# Patient Record
Sex: FEMALE | Race: BLACK OR AFRICAN AMERICAN | NOT HISPANIC OR LATINO
[De-identification: names, ages, dates, MRNs, and addresses within clinical notes are randomized per-mention and may not be internally consistent; named-entity substitution may affect disease eponyms.]

---

## 2018-02-07 ENCOUNTER — APPOINTMENT (OUTPATIENT)
Dept: SURGERY | Facility: CLINIC | Age: 48
End: 2018-02-07

## 2018-03-14 ENCOUNTER — OUTPATIENT (OUTPATIENT)
Dept: OUTPATIENT SERVICES | Facility: HOSPITAL | Age: 48
LOS: 1 days | End: 2018-03-14
Payer: COMMERCIAL

## 2018-03-14 ENCOUNTER — APPOINTMENT (OUTPATIENT)
Dept: SURGERY | Facility: CLINIC | Age: 48
End: 2018-03-14

## 2018-03-14 VITALS
TEMPERATURE: 99 F | SYSTOLIC BLOOD PRESSURE: 109 MMHG | WEIGHT: 293 LBS | HEART RATE: 92 BPM | OXYGEN SATURATION: 96 % | DIASTOLIC BLOOD PRESSURE: 70 MMHG | HEIGHT: 63 IN

## 2018-03-14 DIAGNOSIS — Z01.818 ENCOUNTER FOR OTHER PREPROCEDURAL EXAMINATION: ICD-10-CM

## 2018-03-14 DIAGNOSIS — Z98.890 OTHER SPECIFIED POSTPROCEDURAL STATES: Chronic | ICD-10-CM

## 2018-03-14 DIAGNOSIS — E66.01 MORBID (SEVERE) OBESITY DUE TO EXCESS CALORIES: ICD-10-CM

## 2018-03-14 LAB
ALBUMIN SERPL ELPH-MCNC: 3.6 G/DL — SIGNIFICANT CHANGE UP (ref 3.3–5)
ALP SERPL-CCNC: 85 U/L — SIGNIFICANT CHANGE UP (ref 40–120)
ALT FLD-CCNC: 11 U/L — SIGNIFICANT CHANGE UP (ref 10–45)
ANION GAP SERPL CALC-SCNC: 13 MMOL/L — SIGNIFICANT CHANGE UP (ref 5–17)
APPEARANCE UR: CLEAR — SIGNIFICANT CHANGE UP
APTT BLD: 31.5 SEC — SIGNIFICANT CHANGE UP (ref 27.5–37.4)
AST SERPL-CCNC: 15 U/L — SIGNIFICANT CHANGE UP (ref 10–40)
BACTERIA # UR AUTO: PRESENT /HPF
BILIRUB SERPL-MCNC: 0.6 MG/DL — SIGNIFICANT CHANGE UP (ref 0.2–1.2)
BILIRUB UR-MCNC: NEGATIVE — SIGNIFICANT CHANGE UP
BUN SERPL-MCNC: 14 MG/DL — SIGNIFICANT CHANGE UP (ref 7–23)
CALCIUM SERPL-MCNC: 9.2 MG/DL — SIGNIFICANT CHANGE UP (ref 8.4–10.5)
CHLORIDE SERPL-SCNC: 101 MMOL/L — SIGNIFICANT CHANGE UP (ref 96–108)
CO2 SERPL-SCNC: 26 MMOL/L — SIGNIFICANT CHANGE UP (ref 22–31)
COLOR SPEC: YELLOW — SIGNIFICANT CHANGE UP
CREAT SERPL-MCNC: 0.82 MG/DL — SIGNIFICANT CHANGE UP (ref 0.5–1.3)
DIFF PNL FLD: NEGATIVE — SIGNIFICANT CHANGE UP
EPI CELLS # UR: SIGNIFICANT CHANGE UP /HPF (ref 0–5)
GLUCOSE SERPL-MCNC: 84 MG/DL — SIGNIFICANT CHANGE UP (ref 70–99)
GLUCOSE UR QL: NEGATIVE — SIGNIFICANT CHANGE UP
HCG SERPL-ACNC: <.1 MIU/ML — SIGNIFICANT CHANGE UP
HCT VFR BLD CALC: 40.3 % — SIGNIFICANT CHANGE UP (ref 34.5–45)
HGB BLD-MCNC: 12.6 G/DL — SIGNIFICANT CHANGE UP (ref 11.5–15.5)
INR BLD: 1.03 — SIGNIFICANT CHANGE UP (ref 0.88–1.16)
KETONES UR-MCNC: NEGATIVE — SIGNIFICANT CHANGE UP
LEUKOCYTE ESTERASE UR-ACNC: NEGATIVE — SIGNIFICANT CHANGE UP
MCHC RBC-ENTMCNC: 28.6 PG — SIGNIFICANT CHANGE UP (ref 27–34)
MCHC RBC-ENTMCNC: 31.3 G/DL — LOW (ref 32–36)
MCV RBC AUTO: 91.4 FL — SIGNIFICANT CHANGE UP (ref 80–100)
NITRITE UR-MCNC: NEGATIVE — SIGNIFICANT CHANGE UP
PH UR: 6 — SIGNIFICANT CHANGE UP (ref 5–8)
PLATELET # BLD AUTO: 203 K/UL — SIGNIFICANT CHANGE UP (ref 150–400)
POTASSIUM SERPL-MCNC: 3.8 MMOL/L — SIGNIFICANT CHANGE UP (ref 3.5–5.3)
POTASSIUM SERPL-SCNC: 3.8 MMOL/L — SIGNIFICANT CHANGE UP (ref 3.5–5.3)
PROT SERPL-MCNC: 7.4 G/DL — SIGNIFICANT CHANGE UP (ref 6–8.3)
PROT UR-MCNC: (no result) MG/DL
PROTHROM AB SERPL-ACNC: 11.4 SEC — SIGNIFICANT CHANGE UP (ref 9.8–12.7)
RBC # BLD: 4.41 M/UL — SIGNIFICANT CHANGE UP (ref 3.8–5.2)
RBC # FLD: 14.5 % — SIGNIFICANT CHANGE UP (ref 10.3–16.9)
RBC CASTS # UR COMP ASSIST: < 5 /HPF — SIGNIFICANT CHANGE UP
SODIUM SERPL-SCNC: 140 MMOL/L — SIGNIFICANT CHANGE UP (ref 135–145)
SP GR SPEC: 1.02 — SIGNIFICANT CHANGE UP (ref 1–1.03)
UROBILINOGEN FLD QL: 0.2 E.U./DL — SIGNIFICANT CHANGE UP
WBC # BLD: 5.5 K/UL — SIGNIFICANT CHANGE UP (ref 3.8–10.5)
WBC # FLD AUTO: 5.5 K/UL — SIGNIFICANT CHANGE UP (ref 3.8–10.5)
WBC UR QL: < 5 /HPF — SIGNIFICANT CHANGE UP

## 2018-03-14 PROCEDURE — 71046 X-RAY EXAM CHEST 2 VIEWS: CPT | Mod: 26

## 2018-03-14 PROCEDURE — 93010 ELECTROCARDIOGRAM REPORT: CPT

## 2018-03-14 RX ORDER — VITAMIN/MINERAL SUPPLEMENT WITH MAGNESIUM CARBONATE, CALCIUM CARBONATE AND FOLIC ACID 115; 80; 1 MG/1; MG/1; MG/1
0 TABLET, FILM COATED ORAL
Qty: 0 | Refills: 0 | COMMUNITY

## 2018-03-14 RX ORDER — BENZOYL PEROXIDE MICRONIZED 5.8 %
0 TOWELETTE (EA) TOPICAL
Qty: 0 | Refills: 0 | COMMUNITY

## 2018-03-14 RX ORDER — DULAGLUTIDE 4.5 MG/.5ML
0 INJECTION, SOLUTION SUBCUTANEOUS
Qty: 0 | Refills: 0 | COMMUNITY

## 2018-03-14 RX ORDER — VITAMIN E 100 UNIT
0 CAPSULE ORAL
Qty: 0 | Refills: 0 | COMMUNITY

## 2018-03-14 RX ORDER — ASCORBIC ACID 60 MG
1 TABLET,CHEWABLE ORAL
Qty: 0 | Refills: 0 | COMMUNITY

## 2018-03-14 RX ORDER — ZINC SULFATE TAB 220 MG (50 MG ZINC EQUIVALENT) 220 (50 ZN) MG
1 TAB ORAL
Qty: 0 | Refills: 0 | COMMUNITY

## 2018-03-14 NOTE — ASU PATIENT PROFILE, ADULT - PMH
Asthma    Diabetes  prediabetic  Fibroid uterus    GERD (gastroesophageal reflux disease)    Morbid obesity    Renal calculi Asthma    Diabetes  prediabetic  Fibroid uterus    GERD (gastroesophageal reflux disease)    Morbid obesity    Renal calculi    Sleep apnea

## 2018-03-14 NOTE — H&P PST ADULT - PMH
Asthma    Diabetes  prediabetic  Fibroid uterus    GERD (gastroesophageal reflux disease)    Morbid obesity    Renal calculi    Sleep apnea

## 2018-04-23 ENCOUNTER — INPATIENT (INPATIENT)
Facility: HOSPITAL | Age: 48
LOS: 0 days | Discharge: HOME | End: 2018-04-23
Attending: SURGERY | Admitting: SURGERY

## 2018-04-23 DIAGNOSIS — G47.30 SLEEP APNEA, UNSPECIFIED: ICD-10-CM

## 2018-04-23 DIAGNOSIS — E66.01 MORBID (SEVERE) OBESITY DUE TO EXCESS CALORIES: ICD-10-CM

## 2018-04-23 DIAGNOSIS — Z98.890 OTHER SPECIFIED POSTPROCEDURAL STATES: Chronic | ICD-10-CM

## 2018-04-23 DIAGNOSIS — Z91.013 ALLERGY TO SEAFOOD: ICD-10-CM

## 2018-04-23 DIAGNOSIS — R73.03 PREDIABETES: ICD-10-CM

## 2018-04-23 DIAGNOSIS — K21.9 GASTRO-ESOPHAGEAL REFLUX DISEASE WITHOUT ESOPHAGITIS: ICD-10-CM

## 2018-04-23 DIAGNOSIS — J45.909 UNSPECIFIED ASTHMA, UNCOMPLICATED: ICD-10-CM

## 2018-06-22 NOTE — PATIENT PROFILE ADULT. - SOURCE OF INFORMATION, PROFILE
"  History     Chief Complaint:  Abdominal Pain     HPI   Renetta Steward is a 34 year old female, with a history of recent miscarriage (06/14/18) who presents with abdominal pain. Per report, the patient was in for an ultrasound 7 days ago when her OBGYN diagnosed her with a miscarriage. 4 days later, because the patient had not yet passed her miscarriage, she was given Misoprostol, of which she notes that she tried taking, but was unable to tolerate it buccally 2/2 nausea and accidentally swallowed it. Following this incident, the patient has noticed that her cramps are worsening, and as of this morning she awoke with a sharp pain to her midline lower abd. She called her nurse in reference to this, who sent her here for another dose of Misoprostol \"as she didn't take the first dose right.\" In addition to her prescribed medication, the patient has taken Advil for her pain. She denies significant vaginal bleeding/passing large clots or tissue vaginally.    Allergies:  No Known Allergies     Medications:    Prilosec    Past Medical History:    Miscarriage     Past Surgical History:    History reviewed. No pertinent surgical history.    Family History:    History reviewed. No pertinent family history.     Social History:  The patient was accompanied to the ED by her partner.  Smoking Status: Never  Smokeless Tobacco: No  Alcohol Use: No    Review of Systems   Constitutional: Negative for fever.   Gastrointestinal: Positive for abdominal pain.   Genitourinary: Positive for menstrual problem and vaginal bleeding.     Physical Exam   First Vitals:  BP: 118/57  Heart Rate: 81  Temp: 98.1  F (36.7  C)  Resp: 16  Weight: 59 kg (130 lb)  SpO2: 99 %    Physical Exam  General/Appearance: appears stated age, well-groomed, appears uncomfortable  Eyes: EOMI, no scleral injection, no icterus  ENT: MMM  Neck: supple, nl ROM, no stiffness  Cardiovascular: RRR, nl S1S2, no m/r/g, 2+ pulses in all 4 extremities, cap refill " "<2sec  Respiratory: CTAB, good air movement throughout, no wheezes/rhonchi/rales, no increased WOB, no retractions  Back: no lesions  GI: abd soft, non-distended, nttp (\"feels better when you push on it\"),  no HSM, no rebound, no guarding, nl BS  MSK: SHER, good tone, no bony abnormality  Skin: warm and well-perfused, no rash, no edema, no ecchymosis, nl turgor  Neuro: GCS 15, alert and oriented, no gross focal neuro deficits  Psych: interacts appropriately  Heme: no petechia, no purpura, no active bleeding    Emergency Department Course     Imaging:  Radiology findings were communicated with the patient who voiced understanding of the findings.  OB US 1st Trimester w Transvag:  IMPRESSION:  1. Intrauterine gestational sac with a fetal pole measuring 0.6 cm in  length. No cardiac activity is seen. These findings are consistent  with fetal demise.  2. No hemorrhage or hematoma identified.    Laboratory:  Laboratory findings were communicated with the patient who voiced understanding of the findings.  CBC: pending  BMP: pending      Interventions:   - Norco 5-325 mg PO  Misoprostol 800mcg      Medications   HYDROcodone-acetaminophen (NORCO) 5-325 MG per tablet 1 tablet (1 tablet Oral Given 18)   misoprostol (CYTOTEC) tablet 800 mcg (800 mcg Oral Given 18)   ondansetron (ZOFRAN-ODT) ODT tab 4 mg (4 mg Oral Given 18)        Emergency Department Course:  Nursing notes and vitals reviewed.  IV was inserted and blood was drawn for laboratory testing, results above.  The patient was sent for a OB US 1 trimester while in the emergency department, results above.     : I performed an exam of the patient as documented above.       Impression & Plan      Medical Decision Making:  This pt is a 35 yo A1 female with known missed miscarriage who presents several days after taking Misoprostol with increasing abd pain but lack of passing tissue. US confirms continued fetus without FHT. As she " didn't take Misoprostol correctly the first time we'll redose it here. Labs were ordered both for a baseline level as well as to evaluate the WBC. She has no reproducible ttp (especially low suprapubic) (and states deep palpation actually helps the pain) nor vaginal discharge to suggest septic . The labs, however, will be followed-up on my the oncoming physician. The pt will call her OB tomorrow.      Diagnosis:    ICD-10-CM    1. Spontaneous miscarriage O03.9 Basic metabolic panel     CBC with platelets differential       Disposition:  Pending labs -- TBD by oncoming physician    Discharge Medications:  Discharge Medication List as of 2018 11:09 PM      START taking these medications    Details   HYDROcodone-acetaminophen (NORCO) 5-325 MG per tablet Take 1 tablet by mouth every 4 hours as needed for pain, Disp-18 tablet, R-0, Local Print               Jo Luevano  2018    EMERGENCY DEPARTMENT  I, Jo Luevano, am serving as a scribe at 8:37 PM on 2018 to document services personally performed by Jael Stevenson based on my observations and the provider's statements to me.       Jael Stevenson MD  [phone]     patient

## 2019-05-16 PROBLEM — G47.30 SLEEP APNEA, UNSPECIFIED: Chronic | Status: ACTIVE | Noted: 2018-03-14

## 2019-05-16 PROBLEM — D25.9 LEIOMYOMA OF UTERUS, UNSPECIFIED: Chronic | Status: ACTIVE | Noted: 2018-03-14

## 2019-05-16 PROBLEM — K21.9 GASTRO-ESOPHAGEAL REFLUX DISEASE WITHOUT ESOPHAGITIS: Chronic | Status: ACTIVE | Noted: 2018-03-14

## 2019-05-16 PROBLEM — N20.0 CALCULUS OF KIDNEY: Chronic | Status: ACTIVE | Noted: 2018-03-14

## 2019-05-16 PROBLEM — E66.01 MORBID (SEVERE) OBESITY DUE TO EXCESS CALORIES: Chronic | Status: ACTIVE | Noted: 2018-03-14

## 2019-05-16 PROBLEM — E11.9 TYPE 2 DIABETES MELLITUS WITHOUT COMPLICATIONS: Chronic | Status: ACTIVE | Noted: 2018-03-14

## 2019-05-16 PROBLEM — J45.909 UNSPECIFIED ASTHMA, UNCOMPLICATED: Chronic | Status: ACTIVE | Noted: 2018-03-14

## 2019-05-20 ENCOUNTER — APPOINTMENT (OUTPATIENT)
Dept: PHYSICAL MEDICINE AND REHAB | Facility: CLINIC | Age: 49
End: 2019-05-20

## 2019-06-05 ENCOUNTER — APPOINTMENT (OUTPATIENT)
Dept: ORTHOPEDIC SURGERY | Facility: CLINIC | Age: 49
End: 2019-06-05

## 2022-06-10 ENCOUNTER — APPOINTMENT (OUTPATIENT)
Dept: PULMONOLOGY | Facility: CLINIC | Age: 52
End: 2022-06-10
Payer: COMMERCIAL

## 2022-06-10 VITALS
HEART RATE: 97 BPM | OXYGEN SATURATION: 96 % | WEIGHT: 293 LBS | HEIGHT: 62 IN | DIASTOLIC BLOOD PRESSURE: 102 MMHG | TEMPERATURE: 98.3 F | SYSTOLIC BLOOD PRESSURE: 147 MMHG | BODY MASS INDEX: 53.92 KG/M2

## 2022-06-10 DIAGNOSIS — Z87.09 PERSONAL HISTORY OF OTHER DISEASES OF THE RESPIRATORY SYSTEM: ICD-10-CM

## 2022-06-10 DIAGNOSIS — Z83.49 FAMILY HISTORY OF OTHER ENDOCRINE, NUTRITIONAL AND METABOLIC DISEASES: ICD-10-CM

## 2022-06-10 DIAGNOSIS — R06.83 SNORING: ICD-10-CM

## 2022-06-10 DIAGNOSIS — Z83.3 FAMILY HISTORY OF DIABETES MELLITUS: ICD-10-CM

## 2022-06-10 PROCEDURE — 99204 OFFICE O/P NEW MOD 45 MIN: CPT

## 2022-06-10 RX ORDER — DULAGLUTIDE 3 MG/.5ML
3 INJECTION, SOLUTION SUBCUTANEOUS
Refills: 0 | Status: ACTIVE | COMMUNITY

## 2022-06-10 RX ORDER — OMEPRAZOLE 20 MG/1
20 TABLET, DELAYED RELEASE ORAL
Refills: 0 | Status: ACTIVE | COMMUNITY

## 2022-06-10 NOTE — PHYSICAL EXAM
[General Appearance - Well Developed] : well developed [Normal Appearance] : normal appearance [Well Groomed] : well groomed [General Appearance - Well Nourished] : well nourished [No Deformities] : no deformities [General Appearance - In No Acute Distress] : no acute distress [FreeTextEntry1] : obese [Normal Conjunctiva] : the conjunctiva exhibited no abnormalities [Eyelids - No Xanthelasma] : the eyelids demonstrated no xanthelasmas [Retrognathia] : no retrognathia [Micrognathia] : no micrognathia [III] : III [Neck Appearance] : the appearance of the neck was normal [Neck Cervical Mass (___cm)] : no neck mass was observed [Jugular Venous Distention Increased] : there was no jugular-venous distention [Thyroid Diffuse Enlargement] : the thyroid was not enlarged [Thyroid Nodule] : there were no palpable thyroid nodules [Heart Rate And Rhythm] : heart rate was normal and rhythm regular [Heart Sounds] : normal S1 and S2 [Heart Sounds Gallop] : no gallops [Murmurs] : no murmurs [Heart Sounds Pericardial Friction Rub] : no pericardial rub [Auscultation Breath Sounds / Voice Sounds] : lungs were clear to auscultation bilaterally [Abnormal Walk] : normal gait [Musculoskeletal - Swelling] : no joint swelling seen [Motor Tone] : muscle strength and tone were normal [Nail Clubbing] : no clubbing of the fingernails [Cyanosis, Localized] : no localized cyanosis [Petechial Hemorrhages (___cm)] : no petechial hemorrhages [] : no ischemic changes [Deep Tendon Reflexes (DTR)] : deep tendon reflexes were 2+ and symmetric [Sensation] : the sensory exam was normal to light touch and pinprick [No Focal Deficits] : no focal deficits

## 2022-06-10 NOTE — ASSESSMENT
[FreeTextEntry1] : snoring, witnessed apnea, BMI 50s, excessive daytime somnolence \par \par Pt also mentions memory difficulties- this could also be at least partly due to untreated obstructive sleep apnea. \par \par Based on history and physical exam, sleep disordered breathing is   likely.  The patient was advised to have overnight unattended home sleep testing as a first approach.  If this is not definitive may need overnight polysomnography. Will be seen in follow up after testing.  If positive as expected will order CPAP.  Discussed other treatment options, but likely needs urgent rx w CPAP

## 2022-06-10 NOTE — HISTORY OF PRESENT ILLNESS
[FreeTextEntry1] : 06/10/2022 :  ALEX BARNHART is a 52 year old female who is here for obstructive sleep apnea. Tells me on CPAP in past (10+ yrs ago?), not very comfortable, usage for <1 yr.  Had unattended home sleep testing at [facility] about 3 yrs ago, told she had obstructive sleep apnea, no details, no recent rx.   Notes severe excessive daytime somnolence with Cameron sleepiness scale (out of 24 points) = 15, told of severe snoring and witnessed apnea.  Often wakes with headache, resolves after 30 min. Usual bedtime 2 am, short latency, 3 wakes, up at 9 am.   Morbidly obese, has considered bariatric surgery.  Gained 25 lbs past few years.

## 2022-06-20 ENCOUNTER — APPOINTMENT (OUTPATIENT)
Dept: SLEEP CENTER | Facility: HOME HEALTH | Age: 52
End: 2022-06-20

## 2023-12-20 ENCOUNTER — APPOINTMENT (OUTPATIENT)
Dept: PULMONOLOGY | Facility: CLINIC | Age: 53
End: 2023-12-20

## 2024-01-31 ENCOUNTER — APPOINTMENT (OUTPATIENT)
Dept: PULMONOLOGY | Facility: CLINIC | Age: 54
End: 2024-01-31
Payer: COMMERCIAL

## 2024-01-31 VITALS
HEART RATE: 89 BPM | SYSTOLIC BLOOD PRESSURE: 136 MMHG | BODY MASS INDEX: 52.63 KG/M2 | HEIGHT: 62 IN | WEIGHT: 286 LBS | OXYGEN SATURATION: 98 % | RESPIRATION RATE: 12 BRPM | DIASTOLIC BLOOD PRESSURE: 91 MMHG | TEMPERATURE: 97.7 F

## 2024-01-31 PROCEDURE — 99214 OFFICE O/P EST MOD 30 MIN: CPT

## 2024-02-05 ENCOUNTER — OUTPATIENT (OUTPATIENT)
Dept: OUTPATIENT SERVICES | Facility: HOSPITAL | Age: 54
LOS: 1 days | End: 2024-02-05
Payer: COMMERCIAL

## 2024-02-05 ENCOUNTER — APPOINTMENT (OUTPATIENT)
Dept: SLEEP CENTER | Facility: HOSPITAL | Age: 54
End: 2024-02-05

## 2024-02-05 DIAGNOSIS — Z98.890 OTHER SPECIFIED POSTPROCEDURAL STATES: Chronic | ICD-10-CM

## 2024-02-05 DIAGNOSIS — G47.33 OBSTRUCTIVE SLEEP APNEA (ADULT) (PEDIATRIC): ICD-10-CM

## 2024-02-05 PROCEDURE — 95810 POLYSOM 6/> YRS 4/> PARAM: CPT | Mod: 26

## 2024-02-05 PROCEDURE — 95810 POLYSOM 6/> YRS 4/> PARAM: CPT

## 2024-02-27 ENCOUNTER — APPOINTMENT (OUTPATIENT)
Dept: PULMONOLOGY | Facility: CLINIC | Age: 54
End: 2024-02-27
Payer: COMMERCIAL

## 2024-02-27 PROCEDURE — 99213 OFFICE O/P EST LOW 20 MIN: CPT

## 2024-02-27 NOTE — ASSESSMENT
[FreeTextEntry1] : Treatment options for sleep disordered breathing were discussed.  The most rapid and successful treatment remains nasal CPAP or BilevelPAP.  Alternatives include upper airway surgery such as uvulopharyngoplasty or a dental appliance (better for milder cases).  Recently hypoglossal nerve stimulation has been used.  Positional therapy (avoidance of supine posture) can be helpful, and all patients should try to maintain a healthy weight and avoid alcohol or other sedating medications close to bedtime    Treatment will be ordered with autotitrating CPAP, which will be downloaded after a few weeks of use to check compliance and optimize pressure based on efficacy.  If not comfortable with this treatment, polysomnography with CPAP titration may be needed. Discussed choice of interface, cleaning, humidifier water, adjusting to CPAP, insurance rules for monitoring usage.  Follow up after one month of use or earlier if any problems.

## 2024-02-27 NOTE — HISTORY OF PRESENT ILLNESS
[FreeTextEntry1] : 06/10/2022 :  ALEX BARNHART is a 52 year old female who is here for obstructive sleep apnea. Tells me on CPAP in past (10+ yrs ago?), not very comfortable, usage for <1 yr.  Had unattended home sleep testing at [place] about 3 yrs ago, told she had obstructive sleep apnea, no details, no recent rx.   Notes severe excessive daytime somnolence with [place] sleepiness scale (out of 24 points) = 15, told of severe snoring and witnessed apnea.  Often wakes with headache, resolves after 30 min. Usual bedtime 2 am, short latency, 3 wakes, up at 9 am.   Morbidly obese, has considered bariatric surgery.  Gained 25 lbs past few years.   1/31/24:  after last visit did not have unattended home sleep testing because of back pain. Here today with unchanged sx, in particular frequent AM headache.  She has lost 30 pounds since last seen but symptoms of severe snoring, witnessed apnea, and daytime sleepiness are unchanged.  2/27/2024: Polysomnogram reviewed with patient from February 5, 2024: Very severe sleep disordered breathing, apnea-hypopnea index 82.7 (AASM criteria).  Symptoms of snoring, apnea, and sleepiness unchanged.

## 2024-02-27 NOTE — REASON FOR VISIT
[Home] : at home, [unfilled] , at the time of the visit. [This encounter was initiated by telehealth (audio with video) and converted to telephone (audio only) due to technical difficulties.] : This encounter was initiated by telehealth (audio with video) and converted to telephone (audio only) due to technical difficulties. [Medical Office: ([facility])___] : at the medical office located in

## 2024-02-28 ENCOUNTER — TRANSCRIPTION ENCOUNTER (OUTPATIENT)
Age: 54
End: 2024-02-28

## 2024-03-11 ENCOUNTER — RESULT REVIEW (OUTPATIENT)
Age: 54
End: 2024-03-11

## 2024-03-11 ENCOUNTER — APPOINTMENT (OUTPATIENT)
Dept: ORTHOPEDIC SURGERY | Facility: CLINIC | Age: 54
End: 2024-03-11
Payer: COMMERCIAL

## 2024-03-11 ENCOUNTER — OUTPATIENT (OUTPATIENT)
Dept: OUTPATIENT SERVICES | Facility: HOSPITAL | Age: 54
LOS: 1 days | End: 2024-03-11
Payer: COMMERCIAL

## 2024-03-11 VITALS
SYSTOLIC BLOOD PRESSURE: 142 MMHG | HEART RATE: 76 BPM | WEIGHT: 284 LBS | HEIGHT: 62.5 IN | OXYGEN SATURATION: 99 % | DIASTOLIC BLOOD PRESSURE: 91 MMHG | BODY MASS INDEX: 50.96 KG/M2

## 2024-03-11 DIAGNOSIS — Z98.890 OTHER SPECIFIED POSTPROCEDURAL STATES: Chronic | ICD-10-CM

## 2024-03-11 DIAGNOSIS — M75.40 IMPINGEMENT SYNDROME OF UNSPECIFIED SHOULDER: ICD-10-CM

## 2024-03-11 DIAGNOSIS — M67.919 UNSPECIFIED DISORDER OF SYNOVIUM AND TENDON, UNSPECIFIED SHOULDER: ICD-10-CM

## 2024-03-11 DIAGNOSIS — M19.011 PRIMARY OSTEOARTHRITIS, RIGHT SHOULDER: ICD-10-CM

## 2024-03-11 PROCEDURE — 73030 X-RAY EXAM OF SHOULDER: CPT

## 2024-03-11 PROCEDURE — 73564 X-RAY EXAM KNEE 4 OR MORE: CPT | Mod: 50

## 2024-03-11 PROCEDURE — 99204 OFFICE O/P NEW MOD 45 MIN: CPT

## 2024-03-11 PROCEDURE — 73564 X-RAY EXAM KNEE 4 OR MORE: CPT | Mod: 26,50

## 2024-03-11 PROCEDURE — 73030 X-RAY EXAM OF SHOULDER: CPT | Mod: 26,50

## 2024-03-11 PROCEDURE — 72040 X-RAY EXAM NECK SPINE 2-3 VW: CPT

## 2024-03-11 PROCEDURE — 73564 X-RAY EXAM KNEE 4 OR MORE: CPT

## 2024-03-11 PROCEDURE — 72040 X-RAY EXAM NECK SPINE 2-3 VW: CPT | Mod: 26

## 2024-03-11 PROCEDURE — 73030 X-RAY EXAM OF SHOULDER: CPT | Mod: 50

## 2024-03-11 RX ORDER — GABAPENTIN 100 MG/1
100 CAPSULE ORAL
Qty: 90 | Refills: 1 | Status: ACTIVE | COMMUNITY
Start: 2024-03-11 | End: 1900-01-01

## 2024-03-11 RX ORDER — HYALURONATE SODIUM, STABILIZED 60 MG/3 ML
60 SYRINGE (ML) INTRAARTICULAR
Qty: 2 | Refills: 0 | Status: ACTIVE | COMMUNITY
Start: 2024-03-11

## 2024-03-11 RX ORDER — ETODOLAC 400 MG/1
400 TABLET, FILM COATED ORAL TWICE DAILY
Qty: 30 | Refills: 1 | Status: ACTIVE | COMMUNITY
Start: 2024-03-11 | End: 1900-01-01

## 2024-03-12 NOTE — PHYSICAL EXAM
[de-identified] : General: Well-nourished, well-developed, alert, and in no acute distress. Head: Normocephalic. Eyes: Pupils equal, extraocular muscles intact, normal sclera. Nose: No nasal discharge. Cardiovascular: Extremities are warm and well perfused. Distal pulses are symmetric bilaterally. Respiratory: No labored breathing. Extremities: Sensation is intact distally bilaterally. Distal pulses are symmetric bilaterally Lymphatic: No regional lymphadenopathy, no lymphedema Neurologic: No focal deficits Skin: Normal skin color, texture, and turgor Psychiatric: Normal affect     MSK: C-spine demonstrates loss of lordosis, tenderness to palpation midline, paraspinals, AROM limited due to pain Cervical facet loading positive Spurling's Compression negative  Examination of right shoulder shows no overlying skin changes or muscular atrophy. There is tenderness to palpation over the AC joint, Bicipital groove, Trapezius   Range of motion shows forward elevation of [90], abduction [80], external rotation [40], and internal rotation to the [gluteal area].  There is pain at the end range of motion.   Special Tests: Painful Arc [positive] Neer's [positive] Hawkin's [positive] Maris's positive Resisted ext rotation positive Lift-Off [positive] [place] positive Speed's negative   Examination of left shoulder shows no overlying skin changes or muscular atrophy. There is tenderness to palpation over the AC joint, Bicipital groove, Trapezius   Range of motion shows forward elevation of [140], abduction [120], external rotation [60], and internal rotation to the [lumbar spine].  There is no pain at the end range of motion.   Special Tests: Painful Arc positive Neer's positive Hawkin's negative Maris's positive Resisted ext rotation negative Lift-Off positive [place] positive Speed's negative   Sensation is intact to light touch over the axillary, musculocutaneous, median, radial, and ulnar nerve distributions bilaterally. Capillary refill is less than two seconds. Radial pulses 2+ equal bilaterally. Strength testing shows 5/5 abduction, 5/5 external rotation, 5/5 internal rotation, 5/5 biceps, 5/5 triceps. 5/5 wrist extension, 5/5 intrinsics. Reflexes 2+ biceps, brachioradialis. Man negative.  Examination of [right] knee:   Gait antalgic Genu [valgum]  alignment Mild effusion No erythema, hematoma or skin lesion Tender to palpation: medial joint line, lateral joint line, medial patellar facet, lateral patellar facet Nontender to palpation: quad tendon, patellar tendon, pes, Gerdy's tubercle, tibial tuberosity, popliteal fossa, hamstrings, ITB No warmth No Baker's cyst palpable ROM: 0-[100] Mild patellar crepitus   Log roll negative Lachman negative Anterior drawer negative Posterior drawer negative Varus/valgus stress negative at 0 and 30 deg Chloe negative Patellar grind positive Extensor mechanism intact   Examination of [left] knee:   No effusion, erythema, hematoma or skin lesion Tender to palpation: medial joint line, lateral joint line, medial patellar facet, lateral patellar facet Nontender to palpation:  quad tendon, patellar tendon, pes, Gerdy's tubercle, tibial tuberosity, popliteal fossa, hamstrings, ITB No warmth No Baker's cyst palpable ROM: 0-100 [Mild] patellar crepitus   Log roll negative Lachman negative Anterior drawer negative Posterior drawer negative Varus/valgus stress negative at 0 and 30 deg Chloe negative Patellar grind positive Extensor mechanism intact   Sensation is intact to light touch over the superficial and deep peroneal nerve distributions and the posterior tibial nerve distribution. Capillary refill is less than two seconds. Posterior tibial and dorsalis pedis pulses 2+ equal bilaterally. No calf swelling or tenderness bilaterally. Strength testing shows hip flexion 5/5, hip adduction 5/5, hip abduction 5/5, knee extension 5/5, knee flexion 5/5, dorsiflexion 5/5, plantar flexion 5/5, EHL 5/5 Reflexes: Patellar 2+, Achilles 2+

## 2024-03-12 NOTE — HISTORY OF PRESENT ILLNESS
[de-identified] : KAVEH BARNHART is a 53 year old female who presents with bilateral R>L shoulder and L>R knee pain. Hx polyarthropathy  Had been treated at [facility] with Dr. Yunior Ni Pain Management   Had CSI knee b/l and shoulder right (x2 last July 2023) with Dr. Maria Elena Torres at [facility] Recently changed insurance which is not accepted at [facility]. Patient is right-hand dominant. Recent exacerbation of knee pain last weekend with associated ankle swelling over the weekend. Took Celebrex with limited effect.  12/20/23 had lumbar MBB with plan for RFA but could not complete. States the onset of pain was following 75 lb weight loss over the last year and half (had weighed 340 lb) Had left carpal tunnel release 7/2023 No antecedent trauma or injury. Has not experienced previously. Had attended PT and performing home exercises. 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

## 2024-03-12 NOTE — ASSESSMENT
[FreeTextEntry1] : KAEVH BARNHART is a 53 year old female with bilateral shoulder and knee pain. I discussed with the patient that their symptoms, signs, and imaging are most consistent with cervical spondylosis, rotator cuff tendinopathy ,subacromial impingement and osteoarthritis. We reviewed the natural history of this condition and treatment options. We agreed on the following plan:  XR taken and reviewed with patient today. Activity modification Start Home Exercises for shoulder stretching and strengthening (Neer protocol). Demonstration, resistance band and handout provided.  Physical therapy. Referral provided. Medication: Etodolac prn and Gabapentin 100mg HS increase to 300mg HS as tolerated prescription provided. Referral to Pain Management for LAURITA Consider US guided right subacromial CSI if no symptomatic improvement. Will place order for bilateral knee HA gel injection. Patient will be notified once authorized to schedule appointment. .

## 2024-03-12 NOTE — DISCUSSION/SUMMARY

## 2024-03-21 DIAGNOSIS — M76.892 OTHER SPECIFIED ENTHESOPATHIES OF LEFT LOWER LIMB, EXCLUDING FOOT: ICD-10-CM

## 2024-03-21 DIAGNOSIS — M25.511 PAIN IN RIGHT SHOULDER: ICD-10-CM

## 2024-03-21 DIAGNOSIS — M19.011 PRIMARY OSTEOARTHRITIS, RIGHT SHOULDER: ICD-10-CM

## 2024-03-21 DIAGNOSIS — M25.762 OSTEOPHYTE, LEFT KNEE: ICD-10-CM

## 2024-03-21 DIAGNOSIS — M25.761 OSTEOPHYTE, RIGHT KNEE: ICD-10-CM

## 2024-03-21 DIAGNOSIS — M19.012 PRIMARY OSTEOARTHRITIS, LEFT SHOULDER: ICD-10-CM

## 2024-03-21 DIAGNOSIS — M25.862 OTHER SPECIFIED JOINT DISORDERS, LEFT KNEE: ICD-10-CM

## 2024-03-21 DIAGNOSIS — M25.512 PAIN IN LEFT SHOULDER: ICD-10-CM

## 2024-03-21 DIAGNOSIS — M17.0 BILATERAL PRIMARY OSTEOARTHRITIS OF KNEE: ICD-10-CM

## 2024-03-21 DIAGNOSIS — M76.891 OTHER SPECIFIED ENTHESOPATHIES OF RIGHT LOWER LIMB, EXCLUDING FOOT: ICD-10-CM

## 2024-03-21 DIAGNOSIS — M25.861 OTHER SPECIFIED JOINT DISORDERS, RIGHT KNEE: ICD-10-CM

## 2024-03-21 DIAGNOSIS — M46.02 SPINAL ENTHESOPATHY, CERVICAL REGION: ICD-10-CM

## 2024-03-21 DIAGNOSIS — M47.812 SPONDYLOSIS WITHOUT MYELOPATHY OR RADICULOPATHY, CERVICAL REGION: ICD-10-CM

## 2024-03-21 DIAGNOSIS — M25.78 OSTEOPHYTE, VERTEBRAE: ICD-10-CM

## 2024-03-22 ENCOUNTER — APPOINTMENT (OUTPATIENT)
Dept: ORTHOPEDIC SURGERY | Facility: CLINIC | Age: 54
End: 2024-03-22

## 2024-03-22 VITALS
HEART RATE: 73 BPM | OXYGEN SATURATION: 98 % | BODY MASS INDEX: 50.96 KG/M2 | DIASTOLIC BLOOD PRESSURE: 86 MMHG | HEIGHT: 62.5 IN | WEIGHT: 284 LBS | SYSTOLIC BLOOD PRESSURE: 132 MMHG

## 2024-03-24 NOTE — ASSESSMENT
[FreeTextEntry1] : KAVEH BARNHART is a 53 year old female with bilateral shoulder and knee pain. I discussed with the patient that their symptoms, signs, and imaging are most consistent with  **.  We reviewed the natural history of this condition and treatment options. We agreed on the following plan:  Encouraged to continue home exercises per handout. Continue physical therapy. Recommend 150 min per week of moderate intensity aerobic activity  Medication:    prescription provided. Imaging: Follow up in 6-8 weeks.

## 2024-03-24 NOTE — HISTORY OF PRESENT ILLNESS
[de-identified] : KAVEH BARNHART is a 53 year old female presents to follow up with bilateral shoulder and knee pain. Last visit was 03/11/24 at which time patient was advised to start home exercises and PT. States pain has

## 2024-05-01 ENCOUNTER — NON-APPOINTMENT (OUTPATIENT)
Age: 54
End: 2024-05-01

## 2024-05-02 ENCOUNTER — APPOINTMENT (OUTPATIENT)
Dept: INTERNAL MEDICINE | Facility: CLINIC | Age: 54
End: 2024-05-02
Payer: COMMERCIAL

## 2024-05-02 VITALS
BODY MASS INDEX: 50.96 KG/M2 | TEMPERATURE: 97.8 F | DIASTOLIC BLOOD PRESSURE: 90 MMHG | HEART RATE: 88 BPM | WEIGHT: 284 LBS | SYSTOLIC BLOOD PRESSURE: 141 MMHG | OXYGEN SATURATION: 97 % | HEIGHT: 62.5 IN

## 2024-05-02 DIAGNOSIS — G47.33 OBSTRUCTIVE SLEEP APNEA (ADULT) (PEDIATRIC): ICD-10-CM

## 2024-05-02 DIAGNOSIS — E66.01 MORBID (SEVERE) OBESITY DUE TO EXCESS CALORIES: ICD-10-CM

## 2024-05-02 DIAGNOSIS — K21.9 GASTRO-ESOPHAGEAL REFLUX DISEASE W/OUT ESOPHAGITIS: ICD-10-CM

## 2024-05-02 DIAGNOSIS — J38.1 POLYP OF VOCAL CORD AND LARYNX: ICD-10-CM

## 2024-05-02 DIAGNOSIS — Z00.00 ENCOUNTER FOR GENERAL ADULT MEDICAL EXAMINATION W/OUT ABNORMAL FINDINGS: ICD-10-CM

## 2024-05-02 PROCEDURE — 99386 PREV VISIT NEW AGE 40-64: CPT

## 2024-05-02 PROCEDURE — G0444 DEPRESSION SCREEN ANNUAL: CPT | Mod: 59

## 2024-05-02 PROCEDURE — 36415 COLL VENOUS BLD VENIPUNCTURE: CPT

## 2024-05-02 RX ORDER — FLUTICASONE PROPIONATE 50 UG/1
50 SPRAY, METERED NASAL
Qty: 1 | Refills: 2 | Status: ACTIVE | COMMUNITY
Start: 2024-05-02 | End: 1900-01-01

## 2024-05-02 RX ORDER — ESOMEPRAZOLE MAGNESIUM 40 MG/1
40 CAPSULE, DELAYED RELEASE ORAL
Qty: 90 | Refills: 3 | Status: ACTIVE | COMMUNITY
Start: 2024-05-02 | End: 1900-01-01

## 2024-05-02 NOTE — ASSESSMENT
[FreeTextEntry1] : Needs shingles vaccine Cy in november, few polyps, next 5 years Pap smear last year, no abnormality Last mammo in feb, abnormal as per pt, has f/u in September with new imaging

## 2024-05-02 NOTE — HEALTH RISK ASSESSMENT
[Little interest or pleasure doing things] : 1) Little interest or pleasure doing things [Feeling down, depressed, or hopeless] : 2) Feeling down, depressed, or hopeless [0] : 2) Feeling down, depressed, or hopeless: Not at all (0) [PHQ-2 Negative - No further assessment needed] : PHQ-2 Negative - No further assessment needed [de-identified] : I spend 5 min performing a depression screening on this patient [[id number]] : 0 [Never] : Never

## 2024-05-02 NOTE — HISTORY OF PRESENT ILLNESS
[de-identified] : Ms. BARNHART is a54 year F with PMH of morbid obesity, PCOS, GERD, Cervical spondylosis, b/l knee OA who comes to establish care. Today refer intermittent dry cough for months, she ran out of omeprazole. Would like multiple referrals

## 2024-05-03 LAB
25(OH)D3 SERPL-MCNC: 28.9 NG/ML
ALBUMIN SERPL ELPH-MCNC: 4.2 G/DL
ALP BLD-CCNC: 114 U/L
ALT SERPL-CCNC: 21 U/L
ANION GAP SERPL CALC-SCNC: 15 MMOL/L
APPEARANCE: CLEAR
AST SERPL-CCNC: 20 U/L
BACTERIA: NEGATIVE /HPF
BASOPHILS # BLD AUTO: 0.02 K/UL
BASOPHILS NFR BLD AUTO: 0.4 %
BILIRUB SERPL-MCNC: 0.5 MG/DL
BILIRUBIN URINE: NEGATIVE
BLOOD URINE: NEGATIVE
BUN SERPL-MCNC: 13 MG/DL
CALCIUM SERPL-MCNC: 9.3 MG/DL
CAST: 2 /LPF
CHLORIDE SERPL-SCNC: 106 MMOL/L
CHOLEST SERPL-MCNC: 212 MG/DL
CO2 SERPL-SCNC: 24 MMOL/L
COLOR: NORMAL
CREAT SERPL-MCNC: 0.81 MG/DL
EGFR: 86 ML/MIN/1.73M2
EOSINOPHIL # BLD AUTO: 0.05 K/UL
EOSINOPHIL NFR BLD AUTO: 1 %
EPITHELIAL CELLS: 1 /HPF
ESTIMATED AVERAGE GLUCOSE: 103 MG/DL
GLUCOSE QUALITATIVE U: NEGATIVE MG/DL
GLUCOSE SERPL-MCNC: 93 MG/DL
HBA1C MFR BLD HPLC: 5.2 %
HCT VFR BLD CALC: 44.8 %
HCV AB SER QL: NONREACTIVE
HCV S/CO RATIO: 0.25 S/CO
HDLC SERPL-MCNC: 70 MG/DL
HGB BLD-MCNC: 14 G/DL
HIV1+2 AB SPEC QL IA.RAPID: NONREACTIVE
IMM GRANULOCYTES NFR BLD AUTO: 0.2 %
KETONES URINE: NEGATIVE MG/DL
LDLC SERPL CALC-MCNC: 131 MG/DL
LEUKOCYTE ESTERASE URINE: ABNORMAL
LYMPHOCYTES # BLD AUTO: 2.02 K/UL
LYMPHOCYTES NFR BLD AUTO: 40.7 %
MAN DIFF?: NORMAL
MCHC RBC-ENTMCNC: 29 PG
MCHC RBC-ENTMCNC: 31.3 GM/DL
MCV RBC AUTO: 92.9 FL
MICROSCOPIC-UA: NORMAL
MONOCYTES # BLD AUTO: 0.54 K/UL
MONOCYTES NFR BLD AUTO: 10.9 %
NEUTROPHILS # BLD AUTO: 2.32 K/UL
NEUTROPHILS NFR BLD AUTO: 46.8 %
NITRITE URINE: NEGATIVE
NONHDLC SERPL-MCNC: 142 MG/DL
PH URINE: 6
PLATELET # BLD AUTO: 149 K/UL
POTASSIUM SERPL-SCNC: 4.1 MMOL/L
PROT SERPL-MCNC: 7.4 G/DL
PROTEIN URINE: NORMAL MG/DL
PSA SERPL-MCNC: <0.01 NG/ML
RBC # BLD: 4.82 M/UL
RBC # FLD: 14.6 %
RED BLOOD CELLS URINE: 7 /HPF
REVIEW: NORMAL
SODIUM SERPL-SCNC: 145 MMOL/L
SPECIFIC GRAVITY URINE: 1.03
T PALLIDUM AB SER QL IA: NEGATIVE
TRIGL SERPL-MCNC: 59 MG/DL
TSH SERPL-ACNC: 2.31 UIU/ML
UROBILINOGEN URINE: 1 MG/DL
VIT B12 SERPL-MCNC: 1787 PG/ML
WBC # FLD AUTO: 4.96 K/UL
WHITE BLOOD CELLS URINE: 0 /HPF

## 2024-05-09 ENCOUNTER — APPOINTMENT (OUTPATIENT)
Dept: ORTHOPEDIC SURGERY | Facility: CLINIC | Age: 54
End: 2024-05-09
Payer: COMMERCIAL

## 2024-05-09 VITALS
SYSTOLIC BLOOD PRESSURE: 125 MMHG | HEIGHT: 62.5 IN | HEART RATE: 90 BPM | BODY MASS INDEX: 50.96 KG/M2 | WEIGHT: 284 LBS | DIASTOLIC BLOOD PRESSURE: 86 MMHG | OXYGEN SATURATION: 96 %

## 2024-05-09 DIAGNOSIS — M17.0 BILATERAL PRIMARY OSTEOARTHRITIS OF KNEE: ICD-10-CM

## 2024-05-09 PROCEDURE — 20611 DRAIN/INJ JOINT/BURSA W/US: CPT | Mod: 50

## 2024-05-09 PROCEDURE — 99213 OFFICE O/P EST LOW 20 MIN: CPT | Mod: 25

## 2024-05-09 RX ORDER — NABUMETONE 750 MG/1
750 TABLET, FILM COATED ORAL
Qty: 30 | Refills: 1 | Status: ACTIVE | COMMUNITY
Start: 2024-05-09 | End: 1900-01-01

## 2024-05-11 NOTE — PROCEDURE
[de-identified] :   Ultrasound guided intra-articular steroid injection of left knee:   Following a discussion of the risks (bleeding, infection) and benefits, verbal consent was obtained. Patient placed in supine position. The suprapatellar recess and surrounding structures (patella, femur, quadriceps tendon, suprapatellar fat pad and prefemoral fat pad) were visualized in SAX and LAX with Sonosite 15 Hz linear transducer.    Superolateral knee was anaesthetised with ethyl chloride spray. Under strict sterile technique the right knee was prepped with chlorhexadine. Using ultrasound guidance (superolateral approach) a 20 G 1.5 inch needle was inserted into the suprapatellar recess and after negative aspiration, a mixture of 1mL Triamcinolone, 4mL 0.5% Bupivacaine and 2mL 1% lidocaine was injected intra-articularly.     Ultrasound guided intra-articular steroid injection of right  knee:   The suprapatellar recess and surrounding structures (patella, femur, quadriceps tendon, suprapatellar fat pad and prefemoral fat pad) were visualized in SAX and LAX with Sonosite 15 Hz linear transducer.   Superolateral knee was anaesthetised with ethyl chloride spray. Under strict sterile technique the right knee was prepped with chlorhexadine. Using ultrasound guidance (superolateral approach) a 20 G 1.5 inch needle was inserted into the suprapatellar recess and after negative aspiration, a mixture of 1mL Triamcinolone, 4mL 0.5% Bupivacaine and 2mL 1% lidocaine was injected intra-articularly.   The use of direct ultrasound visualization was necessary to increase patient safety by identifying and avoiding inadvertent needle placement within the neurovascular and osteochondral structures. Additionally, the increased accuracy of needle placement may improve therapeutic efficacy and allow higher diagnostic specificity when evaluating the effectiveness of this injection.   The patient tolerated the procedure well. Post-injection instructions given (no strenuous activity for 48 hours, ice, elevate). Patient verbalized understanding.

## 2024-05-11 NOTE — HISTORY OF PRESENT ILLNESS
Quality 431: Preventive Care And Screening: Unhealthy Alcohol Use - Screening: Patient not identified as an unhealthy alcohol user when screened for unhealthy alcohol use using a systematic screening method
Quality 226: Preventive Care And Screening: Tobacco Use: Screening And Cessation Intervention: Patient screened for tobacco use and is an ex/non-smoker
[de-identified] : KAVEH BARNHART is a 54 year old female presents to follow up bilateral knee pain. Last visit was 03/11/24. Has been performing home exercises for shoulders. Did not start PT. She did not tolerate Etodolac and Gabapentin. Has tried Meloxicam which was ineffective. Last had bilateral knee CSI Aug 2023 at [facility] which relieved pain. Awaiting HA gel injection authorization. Had lumbar MBB at [facility] 12/20/23 and would like repeat with view to RFA. 
Detail Level: Detailed
Quality 130: Documentation Of Current Medications In The Medical Record: Current Medications Documented

## 2024-05-11 NOTE — ASSESSMENT
[FreeTextEntry1] : KAVEH BARNHART is a 54 year old female with bilateral knee pain.    I discussed with the patient that their symptoms, signs, and imaging are most consistent with osteoarthritis. We reviewed the natural history of this condition and treatment options. We agreed on the following plan:  US guided CSI as detailed above. Encouraged to start home exercises for knee conditioning. Handout provided. Start physical therapy. New referral provided. Recommend 150 min per week of moderate intensity aerobic activity  Medication: Nabumetone prn prescription provided. Referral to Pain Management for lumbar MBB provided. Patient will be notified once HA gel injection authorized to schedule appointment.

## 2024-05-17 ENCOUNTER — APPOINTMENT (OUTPATIENT)
Dept: ORTHOPEDIC SURGERY | Facility: CLINIC | Age: 54
End: 2024-05-17
Payer: COMMERCIAL

## 2024-05-17 VITALS
DIASTOLIC BLOOD PRESSURE: 93 MMHG | HEIGHT: 62.5 IN | BODY MASS INDEX: 50.96 KG/M2 | WEIGHT: 284 LBS | OXYGEN SATURATION: 97 % | SYSTOLIC BLOOD PRESSURE: 139 MMHG | HEART RATE: 93 BPM

## 2024-05-17 DIAGNOSIS — M19.012 PRIMARY OSTEOARTHRITIS, RIGHT SHOULDER: ICD-10-CM

## 2024-05-17 DIAGNOSIS — M19.011 PRIMARY OSTEOARTHRITIS, RIGHT SHOULDER: ICD-10-CM

## 2024-05-17 PROCEDURE — 20611 DRAIN/INJ JOINT/BURSA W/US: CPT | Mod: 50

## 2024-05-17 NOTE — PROCEDURE
[de-identified] : KAVEH BARNHART is a 54 year old female with bilateral shoulder pain due to osteoarthritis presents for US guided CSI. No recent infection, fever, chills or skin lesion.  Ultrasound-Guided Injection of right Intra-articular Glenohumeral Joint:  Following a discussion of the risks (bleeding, infection) and benefits, verbal consent was obtained. With patient seated on examination table and arm at side, the posterior aspect of the humeral head at the level of the glenoid was identified under ultrasound with Sonosite 15 MHz transducer. The right posterior aspect of the shoulder was anaesthetiised with ethyl chloride spray and prepped with chlorhexadine. Under strict sterile technique a 22 G 1.5 inch needle was inserted into the glenohumeral joint capsule under US guidance using inferior approach (short axis view). After negative aspiration a mixture of 1 mL of 40mg/mL of Triamcinolone, 2 mL of 1% Lidocaine and 4 mL 0.5% Bupivacaine was injected without resistance. Needle location was confirmed on ultrasound.   The use of direct ultrasound visualization was necessary to increase patient safety by identifying and avoiding inadvertent needle placement within the neurovascular and osteochondral structures. Additionally, the increased accuracy of needle placement may improve therapeutic efficacy and allow higher diagnostic specificity when evaluating the effectiveness of this injection.   Ultrasound-Guided Injection of left Intra-articular Glenohumeral Joint:  Following a discussion of the risks (bleeding, infection) and benefits, verbal consent was obtained. With patient seated on examination table and arm at side, the posterior aspect of the humeral head at the level of the glenoid was identified under ultrasound with Sonosite 15 MHz transducer. The right posterior aspect of the shoulder was anaesthetiised with ethyl chloride spray and prepped with chlorhexadine. Under strict sterile technique a 22 G 3.5 inch needle was inserted into the glenohumeral joint capsule under US guidance using inferior approach (short axis view). After negative aspiration a mixture of 1 mL of 40mg/mL of Triamcinolone, 2 mL of 1% Lidocaine and 4 mL 0.5% Bupivacaine was injected without resistance. Needle location was confirmed on ultrasound.   The use of direct ultrasound visualization was necessary to increase patient safety by identifying and avoiding inadvertent needle placement within the neurovascular and osteochondral structures. Additionally, the increased accuracy of needle placement may improve therapeutic efficacy and allow higher diagnostic specificity when evaluating the effectiveness of this injection.  The patient tolerated the procedures well and post injection instructions provided (no strenuous activity for 48 hrs and ice the area). Patient verbalized understanding.

## 2024-05-17 NOTE — PROCEDURE
[de-identified] : KAVEH BARNHART is a 54 year old female with bilateral shoulder pain due to osteoarthritis presents for US guided CSI. No recent infection, fever, chills or skin lesion.  Ultrasound-Guided Injection of right Intra-articular Glenohumeral Joint:  Following a discussion of the risks (bleeding, infection) and benefits, verbal consent was obtained. With patient seated on examination table and arm at side, the posterior aspect of the humeral head at the level of the glenoid was identified under ultrasound with Sonosite 15 MHz transducer. The right posterior aspect of the shoulder was anaesthetiised with ethyl chloride spray and prepped with chlorhexadine. Under strict sterile technique a 22 G 1.5 inch needle was inserted into the glenohumeral joint capsule under US guidance using inferior approach (short axis view). After negative aspiration a mixture of 1 mL of 40mg/mL of Triamcinolone, 2 mL of 1% Lidocaine and 4 mL 0.5% Bupivacaine was injected without resistance. Needle location was confirmed on ultrasound.   The use of direct ultrasound visualization was necessary to increase patient safety by identifying and avoiding inadvertent needle placement within the neurovascular and osteochondral structures. Additionally, the increased accuracy of needle placement may improve therapeutic efficacy and allow higher diagnostic specificity when evaluating the effectiveness of this injection.   Ultrasound-Guided Injection of left Intra-articular Glenohumeral Joint:  Following a discussion of the risks (bleeding, infection) and benefits, verbal consent was obtained. With patient seated on examination table and arm at side, the posterior aspect of the humeral head at the level of the glenoid was identified under ultrasound with Sonosite 15 MHz transducer. The right posterior aspect of the shoulder was anaesthetiised with ethyl chloride spray and prepped with chlorhexadine. Under strict sterile technique a 22 G 3.5 inch needle was inserted into the glenohumeral joint capsule under US guidance using inferior approach (short axis view). After negative aspiration a mixture of 1 mL of 40mg/mL of Triamcinolone, 2 mL of 1% Lidocaine and 4 mL 0.5% Bupivacaine was injected without resistance. Needle location was confirmed on ultrasound.   The use of direct ultrasound visualization was necessary to increase patient safety by identifying and avoiding inadvertent needle placement within the neurovascular and osteochondral structures. Additionally, the increased accuracy of needle placement may improve therapeutic efficacy and allow higher diagnostic specificity when evaluating the effectiveness of this injection.  The patient tolerated the procedures well and post injection instructions provided (no strenuous activity for 48 hrs and ice the area). Patient verbalized understanding.

## 2024-05-20 NOTE — ASSESSMENT
[FreeTextEntry1] : KAVEH BARNHART is a 54 year old female with     I discussed with the patient that their symptoms, signs, and imaging are most consistent with  **.  We reviewed the natural history of this condition and treatment options. We agreed on the following plan:  Encouraged to continue home exercises per handout. Continue physical therapy. Recommend 150 min per week of moderate intensity aerobic activity  Medication:    prescription provided. Imaging: Follow up in 6-8 weeks.

## 2024-05-20 NOTE — HISTORY OF PRESENT ILLNESS
[de-identified] : KAVEH BARNHART is a 54 year old female presents to follow up Last visit was 05/09/2024 at which time patient had a US guided CSI injection of the bilateral knees.

## 2024-05-20 NOTE — HISTORY OF PRESENT ILLNESS
[de-identified] : KAVEH BARNHART is a 54 year old female presents to follow up Last visit was 05/09/2024 at which time patient had a US guided CSI injection of the bilateral knees.

## 2024-06-10 ENCOUNTER — APPOINTMENT (OUTPATIENT)
Dept: PHYSICAL MEDICINE AND REHAB | Facility: CLINIC | Age: 54
End: 2024-06-10
Payer: COMMERCIAL

## 2024-06-10 ENCOUNTER — APPOINTMENT (OUTPATIENT)
Dept: RADIOLOGY | Facility: CLINIC | Age: 54
End: 2024-06-10

## 2024-06-10 ENCOUNTER — OUTPATIENT (OUTPATIENT)
Dept: OUTPATIENT SERVICES | Facility: HOSPITAL | Age: 54
LOS: 1 days | End: 2024-06-10

## 2024-06-10 VITALS
BODY MASS INDEX: 50.96 KG/M2 | DIASTOLIC BLOOD PRESSURE: 83 MMHG | OXYGEN SATURATION: 95 % | WEIGHT: 284 LBS | HEART RATE: 83 BPM | HEIGHT: 62.5 IN | RESPIRATION RATE: 14 BRPM | SYSTOLIC BLOOD PRESSURE: 117 MMHG

## 2024-06-10 DIAGNOSIS — M47.812 SPONDYLOSIS W/OUT MYELOPATHY OR RADICULOPATHY, CERVICAL REGION: ICD-10-CM

## 2024-06-10 DIAGNOSIS — M47.816 SPONDYLOSIS W/OUT MYELOPATHY OR RADICULOPATHY, LUMBAR REGION: ICD-10-CM

## 2024-06-10 PROCEDURE — 72052 X-RAY EXAM NECK SPINE 6/>VWS: CPT | Mod: 26

## 2024-06-10 PROCEDURE — 99205 OFFICE O/P NEW HI 60 MIN: CPT

## 2024-06-10 PROCEDURE — 72110 X-RAY EXAM L-2 SPINE 4/>VWS: CPT | Mod: 26

## 2024-06-10 RX ORDER — TIZANIDINE 4 MG/1
4 TABLET ORAL TWICE DAILY
Qty: 60 | Refills: 0 | Status: ACTIVE | COMMUNITY
Start: 2024-06-10 | End: 1900-01-01

## 2024-06-13 PROBLEM — M47.812 CERVICAL SPONDYLOSIS: Status: ACTIVE | Noted: 2024-03-11

## 2024-06-13 PROBLEM — M47.816 LUMBAR SPONDYLOSIS: Status: ACTIVE | Noted: 2024-05-09

## 2024-06-13 NOTE — PHYSICAL EXAM
[FreeTextEntry1] : Gen: A+O x 3 in NAD Psych: Normal mood and affect. Responds appropriately to commands Eyes: Anicteric. No discharge. EOMI. Resp: Breathing unlabored CV: Well Perfused Ext: No c/c/e  Skin: No lesions noted   Gait: Non antalgic, normal reciprocating heel to toe, able to stand on toes and heels. Tandem gait intact  Negative romberg   Stance: No Trendelenburg sign present with single leg stance     Neuro:   Tone: Normal. No clonus. Sensation: Grossly intact to light touch and pinprick bilateral lower limbs. Proprioception: Intact at big toes bilaterally. Reflexes: 2+  symmetric knee jerk, medial hamstring, ankle jerk. Plantars downgoing bilaterally.   MMT:   5/5 in b/l lower extremities      Spine:   Inspection: Alignment is midline, with no evidence of scoliosis. Iliac crest heights and PSIS heights level. No rib hump noted upon forward flexion of the trunk.    Palpation: There is + tenderness over the midline spinous processes, paravertebral muscles, PSIS,   Neg TTP over sciatic notch, greater trochanters bilaterally.     Lumbar ROM: Flexion, extension, side-bending, rotation, limited in most planes  pain with lateral flexion  pain with oblique extension  pain with lateral rotation      Hip ROM:   -Pain at terminal ROM bilaterally.   -FAIR, FABERE negative bilaterally.            -SLR negative bilaterally    -Crossed SLR negative bilaterally.    -Slump test negative bilaterally.    -Femoral Nerve Stretch test negative bilaterally   -Crossed Femoral Nerve test negative bilaterally        -Facet loading (Kemps Test) didproduce low back pain.   -Midline Sacral Thrust did not produce pain.      -Standing Flexion Test negative   -Seated Flexion Test  positive   -SIJ pain did not elicited with dropping the involved leg off the table while the contralateral hip was held in flexion (Gaenslens Test)   - Normal Hip extension and ilium rotation (Yeomans Test)   - Positive Iliac Compression and Distraction of the ASIS   -Sandoval Finger Test to SIJ negative on two attempts.

## 2024-06-13 NOTE — DATA REVIEWED
[FreeTextEntry1] :   	 ACC: [id number]     EXAM:  XR C SPINE AP LAT ONLY 2-3V   ORDERED BY: CLAUDIA TAVERA  PROCEDURE DATE:  03/11/2024    INTERPRETATION:  EXAM TYPE: XR CERVICAL SPINE AP AND LATERAL 2 OR 3 VIEWS EXAM DATE AND TIME: 3/11/2024 INDICATION: PAIN COMPARISON: None.  TECHNIQUE: 2 views of the cervical spine  IMPRESSION: No acute displaced fracture. Multilevel degenerative changes of the cervical spine with prominent anterior spurring/bridging anterior osteophytes throughout the cervical spine most significant at C2/C3. Disc height loss of cervical spine most significant at C4/C5.  --- End of Report ---  -----  MRI lumbar spine 11/28/2023 multilevel degenerative disease and facet arthritis most pronounced at L5-S1 with moderate to mild left and right neuroforaminal stenosis at L5-S1 affecting the exiting L5 nerve root.

## 2024-06-13 NOTE — ASSESSMENT
[FreeTextEntry1] : Patient with cervical and lumbar spondylosis.  She did well with her initial medial branch block however was unable to follow-up due to injury insurance change.  I had extensive conversation with the patient about the natural history of cervical and lumbar spondylosis.  I explained that she does have findings consistent with DISH in the cervical spine and that she may have limitations in range of motion lifelong.  We discussed management of the surrounding myofascial sequelae of her restricted range of motion in her neck.  She does not have any findings concerning for myelopathy or radiculopathy at this time however we will obtain an MRI.  Regarding the lumbar spine I do believe that medial branches and RFA are appropriate strategy for management however she is currently looking for a quicker solution.  She may have issues with insurance authorization given interruption in the previous sequence and lack of follow-up after the first medial branch.  Regarding the lumbar spine will trial potential paraspinal block with manipulation under ultrasound guidance.  Long-term may progress to medial branches if needed.  Targeted physical therapy prescribed as well all questions asked and answered patient to follow-up for procedures.  Edison Grayson DO, FAAPMR Attending Physician, Interventional Pain Medicine  Department of Physical Medicine and Rehabilitation [facility] | [place] [address] [address] [place] [address] [address] [place] [address] Email: [email or web address]  I have spent greater than 60 minutes preparing to see the patient, collecting relevant history, performing a thorough history and physical examination, counseling the patient regarding my findings ordering the appropriate therapies and tests, communicating with other relevant healthcare professionals, documenting my encounter and coordinating care.

## 2024-06-13 NOTE — HISTORY OF PRESENT ILLNESS
[Back] : back [Neck] : neck [___ yrs] : [unfilled] year(s) ago [9] : an average pain level of 9/10 [8] : a minimum pain level of 8/10 [10] : a maximum pain level of 10/10 [Sharp] : sharp [Bilateral] : bilateral [Shoulder] : shoulder [Laying] : laying [Walking] : walking [PT] : PT [FreeTextEntry1] : Patient with long history of low back and neck pain.  Was being seen at [facility] had undergone first medial branch block which she did very well with however her insurance changed and she was unable to follow-up after the initial procedure.  Back pain is localized no radiation down the legs.  Also with chronic neck pain.  Seeing Dr. Mazariegos for shoulder injections which have helped significantly.  Denies any bowel bladder incontinence saddle pressures or lower extreme weakness. [de-identified] : 8-9

## 2024-06-21 ENCOUNTER — APPOINTMENT (OUTPATIENT)
Dept: MRI IMAGING | Facility: CLINIC | Age: 54
End: 2024-06-21
Payer: COMMERCIAL

## 2024-06-21 ENCOUNTER — APPOINTMENT (OUTPATIENT)
Dept: PHYSICAL MEDICINE AND REHAB | Facility: CLINIC | Age: 54
End: 2024-06-21
Payer: COMMERCIAL

## 2024-06-21 ENCOUNTER — OUTPATIENT (OUTPATIENT)
Dept: OUTPATIENT SERVICES | Facility: HOSPITAL | Age: 54
LOS: 1 days | End: 2024-06-21

## 2024-06-21 VITALS
OXYGEN SATURATION: 96 % | HEIGHT: 62.5 IN | WEIGHT: 284 LBS | BODY MASS INDEX: 50.96 KG/M2 | SYSTOLIC BLOOD PRESSURE: 127 MMHG | DIASTOLIC BLOOD PRESSURE: 84 MMHG | HEART RATE: 99 BPM

## 2024-06-21 DIAGNOSIS — Z98.890 OTHER SPECIFIED POSTPROCEDURAL STATES: Chronic | ICD-10-CM

## 2024-06-21 PROCEDURE — 76942 ECHO GUIDE FOR BIOPSY: CPT

## 2024-06-21 PROCEDURE — 72141 MRI NECK SPINE W/O DYE: CPT | Mod: 26

## 2024-06-21 PROCEDURE — 20552 NJX 1/MLT TRIGGER POINT 1/2: CPT

## 2024-06-21 NOTE — PROCEDURE
[de-identified] :  Ultrasound guided bilateral erector spinae plane block  Provider : Edison Grayson DO  Laterality: Bilateral   The risk and benefits of the procedure were explained to the patient. These include bleeding infection reaction medication. Patient agreed and decided to proceed.  A linear 10 MHz her ultrasound probe was used to visualize the transverse process at L5. The area was prepped with chlorhexidine solution x3. 3.5 inch 22-gauge Quincke needle was used and a plane orientation and guided under ultrasound guidance to the transverse process on each side. A solution containing 4 cc of 0.25% ropivacaine 4 cc of 1% lidocaine and 20 mg of Kenalog was injected on each side. Aspiration was negative prior to injection and there was no resistance on injection.

## 2024-07-19 ENCOUNTER — APPOINTMENT (OUTPATIENT)
Dept: PHYSICAL MEDICINE AND REHAB | Facility: CLINIC | Age: 54
End: 2024-07-19

## 2024-07-19 VITALS
OXYGEN SATURATION: 94 % | SYSTOLIC BLOOD PRESSURE: 133 MMHG | WEIGHT: 284 LBS | DIASTOLIC BLOOD PRESSURE: 88 MMHG | BODY MASS INDEX: 50.96 KG/M2 | HEART RATE: 98 BPM | HEIGHT: 62.5 IN

## 2024-07-19 PROCEDURE — 20552 NJX 1/MLT TRIGGER POINT 1/2: CPT

## 2024-07-19 PROCEDURE — 98926 OSTEOPATH MANJ 3-4 REGIONS: CPT

## 2024-07-23 LAB
ANA PAT FLD IF-IMP: ABNORMAL
ANA SER IF-ACNC: ABNORMAL
BASOPHILS # BLD AUTO: 0.02 K/UL
BASOPHILS NFR BLD AUTO: 0.4 %
CRP SERPL-MCNC: 5 MG/L
EOSINOPHIL # BLD AUTO: 0.07 K/UL
EOSINOPHIL NFR BLD AUTO: 1.3 %
ERYTHROCYTE [SEDIMENTATION RATE] IN BLOOD BY WESTERGREN METHOD: 46 MM/HR
HCT VFR BLD CALC: 42.7 %
HGB BLD-MCNC: 12.9 G/DL
IMM GRANULOCYTES NFR BLD AUTO: 0.2 %
LYMPHOCYTES # BLD AUTO: 2.24 K/UL
LYMPHOCYTES NFR BLD AUTO: 41.5 %
MAN DIFF?: NORMAL
MCHC RBC-ENTMCNC: 29.3 PG
MCHC RBC-ENTMCNC: 30.2 GM/DL
MCV RBC AUTO: 97 FL
MONOCYTES # BLD AUTO: 0.7 K/UL
MONOCYTES NFR BLD AUTO: 13 %
NEUTROPHILS # BLD AUTO: 2.36 K/UL
NEUTROPHILS NFR BLD AUTO: 43.6 %
PLATELET # BLD AUTO: 140 K/UL
RBC # BLD: 4.4 M/UL
RBC # FLD: 14.8 %
WBC # FLD AUTO: 5.4 K/UL

## 2024-08-05 ENCOUNTER — APPOINTMENT (OUTPATIENT)
Dept: INTERNAL MEDICINE | Facility: CLINIC | Age: 54
End: 2024-08-05

## 2024-08-15 ENCOUNTER — APPOINTMENT (OUTPATIENT)
Dept: ORTHOPEDIC SURGERY | Facility: CLINIC | Age: 54
End: 2024-08-15
Payer: COMMERCIAL

## 2024-08-15 VITALS — DIASTOLIC BLOOD PRESSURE: 90 MMHG | OXYGEN SATURATION: 97 % | SYSTOLIC BLOOD PRESSURE: 129 MMHG | HEART RATE: 85 BPM

## 2024-08-15 PROCEDURE — 20611 DRAIN/INJ JOINT/BURSA W/US: CPT | Mod: 50

## 2024-08-15 NOTE — PROCEDURE
[de-identified] : KAVEH BARNHART is a 54 year old female presents today for Bilateral knee Euflexxa HA injection (1/3). No recent infections, fever or skin lesions.   Ultrasound-guided intra-articular viscosupplementation injection of right knee:   Following a discussion of the risks (bleeding, infection) and benefits, verbal consent was obtained. Patient placed in supine position. The suprapatellar recess and surrounding structures (patella, femur, quadriceps tendon, suprapatellar fat pad and prefemoral fat pad) were visualized in SAX and LAX with Sonosite 15 Hz linear transducer.   Superolateral knee was anaesthetised with ethyl chloride spray. Under strict sterile technique the right knee was prepped with chlorhexadine. Using ultrasound guidance (superolateral approach) a 20 G 1.5 inch needle was inserted into the suprapatellar recess and 2 mL of Euflexxa HA gel was injected intra-articularly without resistance.   The use of direct ultrasound visualization was necessary to increase patient safety by identifying and avoiding inadvertent needle placement within the neurovascular and osteochondral structures. Additionally, the increased accuracy of needle placement may improve therapeutic efficacy and allow higher diagnostic specificity when evaluating the effectiveness of this injection.   Ultrasound-guided intra-articular viscosupplementation injection of left knee:    The suprapatellar recess and surrounding structures (patella, femur, quadriceps tendon, suprapatellar fat pad and prefemoral fat pad) were visualized in SAX and LAX with Sonosite 15 Hz linear transducer.   Superolateral knee was anaesthetised with ethyl chloride spray. Under strict sterile technique the right knee was prepped with chlorhexadine. Using ultrasound guidance (superolateral approach) a 20 G 1.5 inch needle was inserted into the suprapatellar recess and 2 mL of Euflexxa HA gel was injected intra-articularly without resistance.   The use of direct ultrasound visualization was necessary to increase patient safety by identifying and avoiding inadvertent needle placement within the neurovascular and osteochondral structures. Additionally, the increased accuracy of needle placement may improve therapeutic efficacy and allow higher diagnostic specificity when evaluating the effectiveness of this injection.   The patient tolerated both procedures well. Post-injection instructions given (no strenuous activity for 48 hours, ice, elevate). Patient verbalized understanding. Follow up in 1 week for injection (2/3)  BILATERAL KNEE EUFLEXXA (1/3) LOT: [id number] EXP:10- MAN: Leighton [facility]: [id number]

## 2024-08-15 NOTE — PROCEDURE
[de-identified] : KAVEH BARNHART is a 54 year old female presents today for Bilateral knee Euflexxa HA injection (1/3). No recent infections, fever or skin lesions.   Ultrasound-guided intra-articular viscosupplementation injection of right knee:   Following a discussion of the risks (bleeding, infection) and benefits, verbal consent was obtained. Patient placed in supine position. The suprapatellar recess and surrounding structures (patella, femur, quadriceps tendon, suprapatellar fat pad and prefemoral fat pad) were visualized in SAX and LAX with Sonosite 15 Hz linear transducer.   Superolateral knee was anaesthetised with ethyl chloride spray. Under strict sterile technique the right knee was prepped with chlorhexadine. Using ultrasound guidance (superolateral approach) a 20 G 1.5 inch needle was inserted into the suprapatellar recess and 2 mL of Euflexxa HA gel was injected intra-articularly without resistance.   The use of direct ultrasound visualization was necessary to increase patient safety by identifying and avoiding inadvertent needle placement within the neurovascular and osteochondral structures. Additionally, the increased accuracy of needle placement may improve therapeutic efficacy and allow higher diagnostic specificity when evaluating the effectiveness of this injection.   Ultrasound-guided intra-articular viscosupplementation injection of left knee:    The suprapatellar recess and surrounding structures (patella, femur, quadriceps tendon, suprapatellar fat pad and prefemoral fat pad) were visualized in SAX and LAX with Sonosite 15 Hz linear transducer.   Superolateral knee was anaesthetised with ethyl chloride spray. Under strict sterile technique the right knee was prepped with chlorhexadine. Using ultrasound guidance (superolateral approach) a 20 G 1.5 inch needle was inserted into the suprapatellar recess and 2 mL of Euflexxa HA gel was injected intra-articularly without resistance.   The use of direct ultrasound visualization was necessary to increase patient safety by identifying and avoiding inadvertent needle placement within the neurovascular and osteochondral structures. Additionally, the increased accuracy of needle placement may improve therapeutic efficacy and allow higher diagnostic specificity when evaluating the effectiveness of this injection.   The patient tolerated both procedures well. Post-injection instructions given (no strenuous activity for 48 hours, ice, elevate). Patient verbalized understanding. Follow up in 1 week for injection (2/3)  BILATERAL KNEE EUFLEXXA (1/3) LOT: [id number] EXP:10- MAN: Leighton [facility]: [id number]

## 2024-08-16 ENCOUNTER — APPOINTMENT (OUTPATIENT)
Dept: PHYSICAL MEDICINE AND REHAB | Facility: CLINIC | Age: 54
End: 2024-08-16
Payer: COMMERCIAL

## 2024-08-16 VITALS
WEIGHT: 284 LBS | OXYGEN SATURATION: 94 % | BODY MASS INDEX: 50.96 KG/M2 | SYSTOLIC BLOOD PRESSURE: 124 MMHG | HEART RATE: 98 BPM | DIASTOLIC BLOOD PRESSURE: 87 MMHG | HEIGHT: 62.5 IN

## 2024-08-16 DIAGNOSIS — M47.816 SPONDYLOSIS W/OUT MYELOPATHY OR RADICULOPATHY, LUMBAR REGION: ICD-10-CM

## 2024-08-16 PROCEDURE — 99214 OFFICE O/P EST MOD 30 MIN: CPT

## 2024-08-16 RX ORDER — METHOCARBAMOL 750 MG/1
750 TABLET, FILM COATED ORAL EVERY 6 HOURS
Qty: 120 | Refills: 2 | Status: ACTIVE | COMMUNITY
Start: 2024-08-16 | End: 1900-01-01

## 2024-08-18 NOTE — ASSESSMENT
[FreeTextEntry1] : Patient with severe lumbar and cervical spondylosis with DISH will overlying rheumatological condition pending evaluation with Dr. Wilder.  Her neck is feeling significantly better after manipulation and trigger point injections.  Did a trial paraspinal injection for her lumbar spine which helped initially but her pain has returned.  She was in the process of being staged for an RFA last year when her insurance changed I believe this to be appropriate we will proceed with MBB's pending new MRI.   Pain has been present for more than 3 months and is significantly interfering with the patient's ability to participate in ADLs and IADLs as well as enjoying a quality of life. Patient had tried and failed conservative treatment.  This includes but is not limited to: Acetaminophen, NSAIDs, muscle relaxants, neuropathic medications, physician directed home exercises and/or physical therapy for at least 8 weeks. After a thorough discussion of risks and benefits patient would like to proceed with bilateral L4-L5 L5-S1 medial branch blocks.    Risks and benefits of having injection discussed with patient. Risks discussed included, but not limited to: pain, infection, bleeding requiring emergency surgery, headaches, damage to vital organs, etc.    At this time, patient appears to be psychologically stable. Based on the history, physical exam and diagnostic findings, patient will benefit from this procedure. The goal of this procedure is to reduce pain and inflammation, improve function and range of motion and to further promote increased activity and return to previous level of functioning. The ultimate goal of performing this procedure is to improve patient's quality of life.    Asking for authorization for bilateral L4-L5 L5-S1 medial branch blocks to help treat patients pain.  Patient will be scheduled for this procedure.  Edison Grayson DO, FAAPMR Attending Physician, Interventional Pain Medicine  Department of Physical Medicine and Rehabilitation [facility] | [facility] [address] [place] [address] Email: [email or web address]  I have spent greater than * minutes preparing to see the patient, collecting relevant history, performing a thorough 30 history and physical examination, counseling the patient regarding my findings ordering the appropriate therapies and tests, communicating with other relevant healthcare professionals, documenting my encounter and coordinating care.

## 2024-08-18 NOTE — DATA REVIEWED
[MRI] : MRI [FreeTextEntry1] : EXAM: [id number] - MR SPINE CERVICAL  - ORDERED BY: LEÓN RAPHAEL   PROCEDURE DATE:  06/21/2024    INTERPRETATION:  Cervical spondylosis.  Technique: MRI of the cervical spine was performed utilizing axial and sagittal  T1, axial and sagittal T2-weighted and axial gradient echo images as well as sagittal STIR images.  There are no prior studies available for comparison.  Findings:  There is anterior ossification extending from C3-C6/C7.  There are large anterior bridging osteophytes seen at C2/C3, C3/C4 C5/C6 and a large anterior osteophyte seen at C6/C7. There is There is normal cervical lordosis. Otherwise the vertebral bodies are of normal height disc spaces demonstrate loss of T2 signal at C6/C7 and C5/C6 consistent with desiccation. There are mild hyperintensity within the C2/C3 C3/C4 and C4/C5 disc spaces that may represent calcifications. Evaluation of the spinal cord demonstrates no evidence of abnormal signal changes.  The marrow signal is within normal limits.  The cervicomedullary junction is normal.  Evaluation of the individual levels demonstrates at the C2/C3 and C3/C4 levels there are are no evidence of a focal disc herniation. The bilateral neuroforamen are patent. There is no evidence of spinal canal stenosis.  At the C4/C5 level there is a minimal broad-based disc protrusion mildly flattening the ventral thecal sac. The bilateral neuroforamen are patent. There is no evidence of spinal cord compression.  At the C5/C6 disc space there is no evidence of a focal disc herniation or spinal cord compression. The bilateral neuroforamen are patent.  At the C6/C7 level there is a minimal disc bulge with a shallow left paracentral disc protrusion contacting the ventral thecal sac. The bilateral neuroforamen are patent. There is no evidence of spinal cord compression.  At the C7/T1 level there is no evidence of a focal disc herniation. There is no evidence of spinal cord compression.  Impression:  Findings are most consistent with diffuse idiopathic skeletal hyperostosis. No evidence of  spinal cord compression. No evidence of foraminal narrowing.  --- End of Report ---

## 2024-08-18 NOTE — ASSESSMENT
[FreeTextEntry1] : Patient with severe lumbar and cervical spondylosis with DISH will overlying rheumatological condition pending evaluation with Dr. Wilder.  Her neck is feeling significantly better after manipulation and trigger point injections.  Did a trial paraspinal injection for her lumbar spine which helped initially but her pain has returned.  She was in the process of being staged for an RFA last year when her insurance changed I believe this to be appropriate we will proceed with MBB's pending new MRI.   Pain has been present for more than 3 months and is significantly interfering with the patient's ability to participate in ADLs and IADLs as well as enjoying a quality of life. Patient had tried and failed conservative treatment.  This includes but is not limited to: Acetaminophen, NSAIDs, muscle relaxants, neuropathic medications, physician directed home exercises and/or physical therapy for at least 8 weeks. After a thorough discussion of risks and benefits patient would like to proceed with bilateral L4-L5 L5-S1 medial branch blocks.    Risks and benefits of having injection discussed with patient. Risks discussed included, but not limited to: pain, infection, bleeding requiring emergency surgery, headaches, damage to vital organs, etc.    At this time, patient appears to be psychologically stable. Based on the history, physical exam and diagnostic findings, patient will benefit from this procedure. The goal of this procedure is to reduce pain and inflammation, improve function and range of motion and to further promote increased activity and return to previous level of functioning. The ultimate goal of performing this procedure is to improve patient's quality of life.    Asking for authorization for bilateral L4-L5 L5-S1 medial branch blocks to help treat patients pain.  Patient will be scheduled for this procedure.  Edison Grayson DO, FAAPMR Attending Physician, Interventional Pain Medicine  Department of Physical Medicine and Rehabilitation [facility] | [facility] [address] [place] [address] Email: [email or web address]  I have spent greater than * minutes preparing to see the patient, collecting relevant history, performing a thorough 30 history and physical examination, counseling the patient regarding my findings ordering the appropriate therapies and tests, communicating with other relevant healthcare professionals, documenting my encounter and coordinating care.

## 2024-08-18 NOTE — DATA REVIEWED
[MRI] : MRI [FreeTextEntry1] : EXAM: [id number] - MR SPINE CERVICAL  - ORDERED BY: LEÓN RAPHAEL   PROCEDURE DATE:  06/21/2024    INTERPRETATION:  Cervical spondylosis.  Technique: MRI of the cervical spine was performed utilizing axial and sagittal  T1, axial and sagittal T2-weighted and axial gradient echo images as well as sagittal STIR images.  There are no prior studies available for comparison.  Findings:  There is anterior ossification extending from C3-C6/C7.  There are large anterior bridging osteophytes seen at C2/C3, C3/C4 C5/C6 and a large anterior osteophyte seen at C6/C7. There is There is normal cervical lordosis. Otherwise the vertebral bodies are of normal height disc spaces demonstrate loss of T2 signal at C6/C7 and C5/C6 consistent with desiccation. There are mild hyperintensity within the C2/C3 C3/C4 and C4/C5 disc spaces that may represent calcifications. Evaluation of the spinal cord demonstrates no evidence of abnormal signal changes.  The marrow signal is within normal limits.  The cervicomedullary junction is normal.  Evaluation of the individual levels demonstrates at the C2/C3 and C3/C4 levels there are are no evidence of a focal disc herniation. The bilateral neuroforamen are patent. There is no evidence of spinal canal stenosis.  At the C4/C5 level there is a minimal broad-based disc protrusion mildly flattening the ventral thecal sac. The bilateral neuroforamen are patent. There is no evidence of spinal cord compression.  At the C5/C6 disc space there is no evidence of a focal disc herniation or spinal cord compression. The bilateral neuroforamen are patent.  At the C6/C7 level there is a minimal disc bulge with a shallow left paracentral disc protrusion contacting the ventral thecal sac. The bilateral neuroforamen are patent. There is no evidence of spinal cord compression.  At the C7/T1 level there is no evidence of a focal disc herniation. There is no evidence of spinal cord compression.  Impression:  Findings are most consistent with diffuse idiopathic skeletal hyperostosis. No evidence of  spinal cord compression. No evidence of foraminal narrowing.  --- End of Report ---

## 2024-08-18 NOTE — HISTORY OF PRESENT ILLNESS
Patient fell on stairs at school on Tuesday. Caught himself with right hand.   Has swelling and pain to right wrist. [Neck] : neck [6] : a minimum pain level of 6/10 [10] : a maximum pain level of 10/10 [FreeTextEntry1] : Patient with long history of low back and neck pain.  Was being seen at [facility] had undergone first medial branch block which she did very well with however her insurance changed and she was unable to follow-up after the initial procedure.  Back pain is localized no radiation down the legs.  Also with chronic neck pain.  Seeing Dr. Mazariegos for shoulder injections which have helped significantly.  Denies any bowel bladder incontinence saddle pressures or lower extreme weakness.

## 2024-08-18 NOTE — HISTORY OF PRESENT ILLNESS
[Neck] : neck [6] : a minimum pain level of 6/10 [10] : a maximum pain level of 10/10 [FreeTextEntry1] : Patient with long history of low back and neck pain.  Was being seen at [facility] had undergone first medial branch block which she did very well with however her insurance changed and she was unable to follow-up after the initial procedure.  Back pain is localized no radiation down the legs.  Also with chronic neck pain.  Seeing Dr. Mazariegos for shoulder injections which have helped significantly.  Denies any bowel bladder incontinence saddle pressures or lower extreme weakness.

## 2024-08-18 NOTE — PHYSICAL EXAM
[FreeTextEntry1] : Gen: A+O x 3 in NAD Psych: Normal mood and affect. Responds appropriately to commands Eyes: Anicteric. No discharge. EOMI. Resp: Breathing unlabored CV: Well Perfused Ext: No c/c/e Skin: No lesions noted Gait: Non antalgic, normal reciprocating heel to toe, able to stand on toes and heels. Tandem gait intact Negative romberg Stance: No Trendelenburg sign present with single leg stance   Neuro:  Tone: Normal. No clonus. Sensation: Grossly intact to light touch and pinprick bilateral lower limbs. Proprioception: Intact at big toes bilaterally. Reflexes: 2+ symmetric knee jerk, medial hamstring, ankle jerk. Plantars downgoing bilaterally.  MMT:  5/5 in b/l lower extremities   Spine:  Inspection: Alignment is midline, with no evidence of scoliosis. Iliac crest heights and PSIS heights level. No rib hump noted upon forward flexion of the trunk.  Palpation: There is + tenderness over the midline spinous processes, paravertebral muscles, PSIS, Neg TTP over sciatic notch, greater trochanters bilaterally.  Lumbar ROM: Flexion, extension, side-bending, rotation, limited in most planes pain with lateral flexion pain with oblique extension pain with lateral rotation   Hip ROM:  -Pain at terminal ROM bilaterally.  -FAIR, FABERE negative bilaterally.  -SLR negative bilaterally  -Crossed SLR negative bilaterally.  -Slump test negative bilaterally.  -Femoral Nerve Stretch test negative bilaterally  -Crossed Femoral Nerve test negative bilaterally  -Facet loading (Kemps Test) didproduce low back pain.  -Midline Sacral Thrust did not produce pain.  -Standing Flexion Test negative  -Seated Flexion Test positive  -SIJ pain did not elicited with dropping the involved leg off the table while the contralateral hip was held in flexion (Gaenslens Test)  - Normal Hip extension and ilium rotation (Yeomans Test)  - Positive Iliac Compression and Distraction of the ASIS  -Sandoval Finger Test to SIJ negative on two attempts

## 2024-08-21 ENCOUNTER — APPOINTMENT (OUTPATIENT)
Dept: RHEUMATOLOGY | Facility: CLINIC | Age: 54
End: 2024-08-21

## 2024-08-22 ENCOUNTER — APPOINTMENT (OUTPATIENT)
Dept: ORTHOPEDIC SURGERY | Facility: CLINIC | Age: 54
End: 2024-08-22
Payer: COMMERCIAL

## 2024-08-22 VITALS — HEART RATE: 70 BPM | DIASTOLIC BLOOD PRESSURE: 92 MMHG | SYSTOLIC BLOOD PRESSURE: 146 MMHG | OXYGEN SATURATION: 97 %

## 2024-08-22 VITALS — SYSTOLIC BLOOD PRESSURE: 134 MMHG | DIASTOLIC BLOOD PRESSURE: 93 MMHG

## 2024-08-22 PROCEDURE — 20611 DRAIN/INJ JOINT/BURSA W/US: CPT | Mod: 50

## 2024-08-23 NOTE — PROCEDURE
[de-identified] : KAVEH BARNHART is a 54 year old female presents today for Bilateral knee Euflexxa HA injection (2/3). No recent infections, fever or skin lesions.  Ultrasound-guided intra-articular viscosupplementation injection of right knee:  Following a discussion of the risks (bleeding, infection) and benefits, verbal consent was obtained. Patient placed in supine position. The suprapatellar recess and surrounding structures (patella, femur, quadriceps tendon, suprapatellar fat pad and prefemoral fat pad) were visualized in SAX and LAX with Sonosite 15 Hz linear transducer.  Superolateral knee was anaesthetised with ethyl chloride spray. Under strict sterile technique the right knee was prepped with chlorhexadine. Using ultrasound guidance (superolateral approach) a 20 G 1.5 inch needle was inserted into the suprapatellar recess and 2 mL of Euflexxa HA gel was injected intra-articularly without resistance.  The use of direct ultrasound visualization was necessary to increase patient safety by identifying and avoiding inadvertent needle placement within the neurovascular and osteochondral structures. Additionally, the increased accuracy of needle placement may improve therapeutic efficacy and allow higher diagnostic specificity when evaluating the effectiveness of this injection.  Ultrasound-guided intra-articular viscosupplementation injection of left knee:   The suprapatellar recess and surrounding structures (patella, femur, quadriceps tendon, suprapatellar fat pad and prefemoral fat pad) were visualized in SAX and LAX with Sonosite 15 Hz linear transducer.  Superolateral knee was anaesthetised with ethyl chloride spray. Under strict sterile technique the right knee was prepped with chlorhexadine. Using ultrasound guidance (superolateral approach) a 20 G 1.5 inch needle was inserted into the suprapatellar recess and 2 mL of Euflexxa HA gel was injected intra-articularly without resistance.  The use of direct ultrasound visualization was necessary to increase patient safety by identifying and avoiding inadvertent needle placement within the neurovascular and osteochondral structures. Additionally, the increased accuracy of needle placement may improve therapeutic efficacy and allow higher diagnostic specificity when evaluating the effectiveness of this injection.  The patient tolerated both procedures well. Post-injection instructions given (no strenuous activity for 48 hours, ice, elevate). Patient verbalized understanding. Follow up in 1 week for injection (3/3)  BILATERAL KNEE EUFLEXXA (2/3) LOT: [id number] EXP:10- MAN: Leighton [facility]: [id number].

## 2024-08-23 NOTE — PROCEDURE
[de-identified] : KAVEH BARNHART is a 54 year old female presents today for Bilateral knee Euflexxa HA injection (2/3). No recent infections, fever or skin lesions.  Ultrasound-guided intra-articular viscosupplementation injection of right knee:  Following a discussion of the risks (bleeding, infection) and benefits, verbal consent was obtained. Patient placed in supine position. The suprapatellar recess and surrounding structures (patella, femur, quadriceps tendon, suprapatellar fat pad and prefemoral fat pad) were visualized in SAX and LAX with Sonosite 15 Hz linear transducer.  Superolateral knee was anaesthetised with ethyl chloride spray. Under strict sterile technique the right knee was prepped with chlorhexadine. Using ultrasound guidance (superolateral approach) a 20 G 1.5 inch needle was inserted into the suprapatellar recess and 2 mL of Euflexxa HA gel was injected intra-articularly without resistance.  The use of direct ultrasound visualization was necessary to increase patient safety by identifying and avoiding inadvertent needle placement within the neurovascular and osteochondral structures. Additionally, the increased accuracy of needle placement may improve therapeutic efficacy and allow higher diagnostic specificity when evaluating the effectiveness of this injection.  Ultrasound-guided intra-articular viscosupplementation injection of left knee:   The suprapatellar recess and surrounding structures (patella, femur, quadriceps tendon, suprapatellar fat pad and prefemoral fat pad) were visualized in SAX and LAX with Sonosite 15 Hz linear transducer.  Superolateral knee was anaesthetised with ethyl chloride spray. Under strict sterile technique the right knee was prepped with chlorhexadine. Using ultrasound guidance (superolateral approach) a 20 G 1.5 inch needle was inserted into the suprapatellar recess and 2 mL of Euflexxa HA gel was injected intra-articularly without resistance.  The use of direct ultrasound visualization was necessary to increase patient safety by identifying and avoiding inadvertent needle placement within the neurovascular and osteochondral structures. Additionally, the increased accuracy of needle placement may improve therapeutic efficacy and allow higher diagnostic specificity when evaluating the effectiveness of this injection.  The patient tolerated both procedures well. Post-injection instructions given (no strenuous activity for 48 hours, ice, elevate). Patient verbalized understanding. Follow up in 1 week for injection (3/3)  BILATERAL KNEE EUFLEXXA (2/3) LOT: [id number] EXP:10- MAN: Leighton [facility]: [id number].

## 2024-08-26 ENCOUNTER — APPOINTMENT (OUTPATIENT)
Dept: MRI IMAGING | Facility: CLINIC | Age: 54
End: 2024-08-26

## 2024-08-29 ENCOUNTER — APPOINTMENT (OUTPATIENT)
Dept: ORTHOPEDIC SURGERY | Facility: CLINIC | Age: 54
End: 2024-08-29
Payer: COMMERCIAL

## 2024-08-29 ENCOUNTER — APPOINTMENT (OUTPATIENT)
Dept: INTERNAL MEDICINE | Facility: CLINIC | Age: 54
End: 2024-08-29
Payer: COMMERCIAL

## 2024-08-29 ENCOUNTER — APPOINTMENT (OUTPATIENT)
Dept: OTOLARYNGOLOGY | Facility: CLINIC | Age: 54
End: 2024-08-29

## 2024-08-29 VITALS
HEART RATE: 82 BPM | SYSTOLIC BLOOD PRESSURE: 135 MMHG | HEIGHT: 62.5 IN | DIASTOLIC BLOOD PRESSURE: 87 MMHG | OXYGEN SATURATION: 91 % | WEIGHT: 292 LBS | BODY MASS INDEX: 52.39 KG/M2

## 2024-08-29 VITALS
HEART RATE: 93 BPM | WEIGHT: 292 LBS | DIASTOLIC BLOOD PRESSURE: 90 MMHG | TEMPERATURE: 95.1 F | SYSTOLIC BLOOD PRESSURE: 133 MMHG | BODY MASS INDEX: 52.56 KG/M2

## 2024-08-29 VITALS — WEIGHT: 292 LBS | BODY MASS INDEX: 51.74 KG/M2 | HEIGHT: 63 IN

## 2024-08-29 DIAGNOSIS — K21.9 GASTRO-ESOPHAGEAL REFLUX DISEASE W/OUT ESOPHAGITIS: ICD-10-CM

## 2024-08-29 DIAGNOSIS — E28.2 POLYCYSTIC OVARIAN SYNDROME: ICD-10-CM

## 2024-08-29 DIAGNOSIS — M47.816 SPONDYLOSIS W/OUT MYELOPATHY OR RADICULOPATHY, LUMBAR REGION: ICD-10-CM

## 2024-08-29 DIAGNOSIS — R49.0 DYSPHONIA: ICD-10-CM

## 2024-08-29 DIAGNOSIS — E66.01 MORBID (SEVERE) OBESITY DUE TO EXCESS CALORIES: ICD-10-CM

## 2024-08-29 DIAGNOSIS — R09.82 POSTNASAL DRIP: ICD-10-CM

## 2024-08-29 DIAGNOSIS — L21.9 SEBORRHEIC DERMATITIS, UNSPECIFIED: ICD-10-CM

## 2024-08-29 DIAGNOSIS — Z00.00 ENCOUNTER FOR GENERAL ADULT MEDICAL EXAMINATION W/OUT ABNORMAL FINDINGS: ICD-10-CM

## 2024-08-29 DIAGNOSIS — M17.0 BILATERAL PRIMARY OSTEOARTHRITIS OF KNEE: ICD-10-CM

## 2024-08-29 PROCEDURE — 99215 OFFICE O/P EST HI 40 MIN: CPT

## 2024-08-29 PROCEDURE — 20611 DRAIN/INJ JOINT/BURSA W/US: CPT | Mod: 50

## 2024-08-29 PROCEDURE — 99204 OFFICE O/P NEW MOD 45 MIN: CPT | Mod: 25

## 2024-08-29 PROCEDURE — 31575 DIAGNOSTIC LARYNGOSCOPY: CPT

## 2024-08-29 PROCEDURE — G2211 COMPLEX E/M VISIT ADD ON: CPT

## 2024-08-29 RX ORDER — DICLOFENAC SODIUM 3 G/100G
3 GEL TOPICAL
Qty: 1 | Refills: 2 | Status: ACTIVE | COMMUNITY
Start: 2024-08-29 | End: 1900-01-01

## 2024-08-29 RX ORDER — OMEPRAZOLE 40 MG/1
40 CAPSULE, DELAYED RELEASE ORAL
Qty: 30 | Refills: 2 | Status: ACTIVE | COMMUNITY
Start: 2024-08-29 | End: 1900-01-01

## 2024-08-29 RX ORDER — KETOCONAZOLE 20.5 MG/ML
2 SHAMPOO, SUSPENSION TOPICAL DAILY
Qty: 1 | Refills: 1 | Status: ACTIVE | COMMUNITY
Start: 2024-08-29 | End: 1900-01-01

## 2024-08-29 RX ORDER — ERGOCALCIFEROL 1.25 MG/1
50000 CAPSULE ORAL
Qty: 12 | Refills: 0 | Status: ACTIVE | COMMUNITY
Start: 2024-08-29 | End: 1900-01-01

## 2024-08-29 RX ADMIN — CYANOCOBALAMIN 1 MCG/ML: 1000 INJECTION INTRAMUSCULAR; SUBCUTANEOUS at 00:00

## 2024-08-29 NOTE — HISTORY OF PRESENT ILLNESS
[de-identified] : Ms. BARNHART is a54 year F with PMH of morbid obesity, PCOS, GERD, Cervical spondylosis, b/l knee OA who comes for follow-up.  No complaints today besides her chronic knee and back pain which she has been seeing pain management doctor and doing PT with mild relief.  Her endocrinologist is not covered by her insurance anymore so she will need to have another referral.

## 2024-08-29 NOTE — ASSESSMENT
[FreeTextEntry1] : Cy in november, few polyps, next 5 years Pap smear last year, no abnormality Last mammo in feb, abnormal as per pt, has f/u in September with new imaging.

## 2024-08-29 NOTE — HISTORY OF PRESENT ILLNESS
[de-identified] : 54F here for initial evaluation.  She is here for general otolaryngologic checkup. She has known GERD for decades and is on daily PPI. She c/o hoarseness, dry cough in varying degrees over the years, usually due to reflux. Diet/lifestyle can be much better. She also has severe MARINA and recently started w CPAP a few months ago which has helped. There is baseline nasal congestion and she takes nasal steroids. She has h/o vocal cord polyp? removal 20years ago after which her hoarseness had improved, but over the years she feels her vocal quality has worsened.  ROS otherwise unremarkable.

## 2024-08-29 NOTE — PHYSICAL EXAM
[FreeTextEntry1] : overweight [de-identified] : thick; no masses/lesions [Midline] : trachea located in midline position [Laryngoscopy Performed] : laryngoscopy was performed, see procedure section for findings [Normal] : no rashes

## 2024-08-29 NOTE — ASSESSMENT
[FreeTextEntry1] : 54F here for general otolaryngologic checkup. She has known GERD for decades and is on daily PPI. She c/o hoarseness, dry cough in varying degrees over the years, usually due to reflux. Diet/lifestyle can be much better. She also has severe MARINA and recently started w CPAP a few months ago which has helped. There is baseline nasal congestion and she takes nasal steroids. Of nbte, she has a h/o vocal cord polyp? removal 20 years ago after which her hoarseness had improved, but over the years she feels her vocal quality has worsened. On exam, pt is overweight with crowded oropharynx. Laryngoscopy shows very subtle vocal cord nodules? but is otherwise unremarkable. Dysphonia in setting of long standing GERD and h/o vocal cord surgery 20 years ago. Will send for formal laryngology consultation as next step. Reflux control w diet/lifestyle stressed.

## 2024-08-29 NOTE — PROCEDURE
[FreeTextEntry3] : Fiberoptic Laryngoscopy: upper airway patent TVF fully mobile tiny b/l vocal nodule?  no obvious masses/lesions

## 2024-08-29 NOTE — PROCEDURE
[de-identified] : KAVEH BARNHART is a 54 year old female presents today for Bilateral knee Euflexxa HA injection (3/3). No recent infections, fever or skin lesions.  Ultrasound-guided intra-articular viscosupplementation injection of right knee:  Following a discussion of the risks (bleeding, infection) and benefits, verbal consent was obtained. Patient placed in supine position. The suprapatellar recess and surrounding structures (patella, femur, quadriceps tendon, suprapatellar fat pad and prefemoral fat pad) were visualized in SAX and LAX with Sonosite 15 Hz linear transducer.  Superolateral knee was anaesthetised with ethyl chloride spray. Under strict sterile technique the right knee was prepped with chlorhexadine. Using ultrasound guidance (superolateral approach) a 20 G 1.5 inch needle was inserted into the suprapatellar recess and 2 mL of Euflexxa HA gel was injected intra-articularly without resistance.  The use of direct ultrasound visualization was necessary to increase patient safety by identifying and avoiding inadvertent needle placement within the neurovascular and osteochondral structures. Additionally, the increased accuracy of needle placement may improve therapeutic efficacy and allow higher diagnostic specificity when evaluating the effectiveness of this injection.  Ultrasound-guided intra-articular viscosupplementation injection of left knee:   The suprapatellar recess and surrounding structures (patella, femur, quadriceps tendon, suprapatellar fat pad and prefemoral fat pad) were visualized in SAX and LAX with Sonosite 15 Hz linear transducer.  Superolateral knee was anaesthetised with ethyl chloride spray. Under strict sterile technique the right knee was prepped with chlorhexadine. Using ultrasound guidance (superolateral approach) a 20 G 1.5 inch needle was inserted into the suprapatellar recess and 2 mL of Euflexxa HA gel was injected intra-articularly without resistance.  The use of direct ultrasound visualization was necessary to increase patient safety by identifying and avoiding inadvertent needle placement within the neurovascular and osteochondral structures. Additionally, the increased accuracy of needle placement may improve therapeutic efficacy and allow higher diagnostic specificity when evaluating the effectiveness of this injection.  The patient tolerated both procedures well. Post-injection instructions given (no strenuous activity for 48 hours, ice, elevate). Patient verbalized understanding. Continue home exercises per handout.  Restart physical therapy. Referral provided. Follow up in 8 weeks.  BILATERAL KNEE EUFLEXXA (3/3) LOT: [id number] EXP:10- MAN: [facility]: [id number].

## 2024-08-29 NOTE — HISTORY OF PRESENT ILLNESS
[de-identified] : Ms. BARNHART is a54 year F with PMH of morbid obesity, PCOS, GERD, Cervical spondylosis, b/l knee OA who comes for follow-up.  No complaints today besides her chronic knee and back pain which she has been seeing pain management doctor and doing PT with mild relief.  Her endocrinologist is not covered by her insurance anymore so she will need to have another referral.

## 2024-08-29 NOTE — PROCEDURE
[de-identified] : KAVEH BARNHART is a 54 year old female presents today for Bilateral knee Euflexxa HA injection (3/3). No recent infections, fever or skin lesions.  Ultrasound-guided intra-articular viscosupplementation injection of right knee:  Following a discussion of the risks (bleeding, infection) and benefits, verbal consent was obtained. Patient placed in supine position. The suprapatellar recess and surrounding structures (patella, femur, quadriceps tendon, suprapatellar fat pad and prefemoral fat pad) were visualized in SAX and LAX with Sonosite 15 Hz linear transducer.  Superolateral knee was anaesthetised with ethyl chloride spray. Under strict sterile technique the right knee was prepped with chlorhexadine. Using ultrasound guidance (superolateral approach) a 20 G 1.5 inch needle was inserted into the suprapatellar recess and 2 mL of Euflexxa HA gel was injected intra-articularly without resistance.  The use of direct ultrasound visualization was necessary to increase patient safety by identifying and avoiding inadvertent needle placement within the neurovascular and osteochondral structures. Additionally, the increased accuracy of needle placement may improve therapeutic efficacy and allow higher diagnostic specificity when evaluating the effectiveness of this injection.  Ultrasound-guided intra-articular viscosupplementation injection of left knee:   The suprapatellar recess and surrounding structures (patella, femur, quadriceps tendon, suprapatellar fat pad and prefemoral fat pad) were visualized in SAX and LAX with Sonosite 15 Hz linear transducer.  Superolateral knee was anaesthetised with ethyl chloride spray. Under strict sterile technique the right knee was prepped with chlorhexadine. Using ultrasound guidance (superolateral approach) a 20 G 1.5 inch needle was inserted into the suprapatellar recess and 2 mL of Euflexxa HA gel was injected intra-articularly without resistance.  The use of direct ultrasound visualization was necessary to increase patient safety by identifying and avoiding inadvertent needle placement within the neurovascular and osteochondral structures. Additionally, the increased accuracy of needle placement may improve therapeutic efficacy and allow higher diagnostic specificity when evaluating the effectiveness of this injection.  The patient tolerated both procedures well. Post-injection instructions given (no strenuous activity for 48 hours, ice, elevate). Patient verbalized understanding. Continue home exercises per handout.  Restart physical therapy. Referral provided. Follow up in 8 weeks.  BILATERAL KNEE EUFLEXXA (3/3) LOT: [id number] EXP:10- MAN: Leighton [facility]: [id number].

## 2024-09-05 ENCOUNTER — APPOINTMENT (OUTPATIENT)
Dept: MRI IMAGING | Facility: HOSPITAL | Age: 54
End: 2024-09-05

## 2024-09-12 ENCOUNTER — RX RENEWAL (OUTPATIENT)
Age: 54
End: 2024-09-12

## 2024-09-19 ENCOUNTER — APPOINTMENT (OUTPATIENT)
Dept: ORTHOPEDIC SURGERY | Facility: CLINIC | Age: 54
End: 2024-09-19

## 2024-09-23 ENCOUNTER — APPOINTMENT (OUTPATIENT)
Dept: MRI IMAGING | Facility: CLINIC | Age: 54
End: 2024-09-23
Payer: COMMERCIAL

## 2024-09-23 PROCEDURE — 72148 MRI LUMBAR SPINE W/O DYE: CPT | Mod: 26

## 2024-09-26 ENCOUNTER — RX RENEWAL (OUTPATIENT)
Age: 54
End: 2024-09-26

## 2024-10-03 ENCOUNTER — APPOINTMENT (OUTPATIENT)
Dept: INTERNAL MEDICINE | Facility: CLINIC | Age: 54
End: 2024-10-03

## 2024-10-03 VITALS
WEIGHT: 275 LBS | HEART RATE: 89 BPM | DIASTOLIC BLOOD PRESSURE: 89 MMHG | SYSTOLIC BLOOD PRESSURE: 131 MMHG | BODY MASS INDEX: 48.71 KG/M2

## 2024-10-03 DIAGNOSIS — L65.9 NONSCARRING HAIR LOSS, UNSPECIFIED: ICD-10-CM

## 2024-10-03 DIAGNOSIS — E66.01 MORBID (SEVERE) OBESITY DUE TO EXCESS CALORIES: ICD-10-CM

## 2024-10-03 DIAGNOSIS — K21.9 GASTRO-ESOPHAGEAL REFLUX DISEASE W/OUT ESOPHAGITIS: ICD-10-CM

## 2024-10-03 DIAGNOSIS — Z23 ENCOUNTER FOR IMMUNIZATION: ICD-10-CM

## 2024-10-03 DIAGNOSIS — T78.2XXA ANAPHYLACTIC SHOCK, UNSPECIFIED, INITIAL ENCOUNTER: ICD-10-CM

## 2024-10-03 PROCEDURE — 90656 IIV3 VACC NO PRSV 0.5 ML IM: CPT

## 2024-10-03 PROCEDURE — G0008: CPT

## 2024-10-03 PROCEDURE — 99215 OFFICE O/P EST HI 40 MIN: CPT | Mod: 25

## 2024-10-03 RX ORDER — FLUOCINOLONE ACETONIDE 0.1 MG/ML
0.01 SOLUTION TOPICAL
Qty: 1 | Refills: 1 | Status: ACTIVE | COMMUNITY
Start: 2024-10-03 | End: 1900-01-01

## 2024-10-03 RX ORDER — PANTOPRAZOLE 40 MG/1
40 TABLET, DELAYED RELEASE ORAL
Qty: 30 | Refills: 3 | Status: ACTIVE | COMMUNITY
Start: 2024-10-03 | End: 1900-01-01

## 2024-10-03 RX ORDER — EPINEPHRINE 0.3 MG/.3ML
0.3 INJECTION INTRAMUSCULAR
Qty: 1 | Refills: 1 | Status: ACTIVE | COMMUNITY
Start: 2024-10-03 | End: 1900-01-01

## 2024-10-03 RX ADMIN — CYANOCOBALAMIN 0 MCG/ML: 1000 INJECTION INTRAMUSCULAR; SUBCUTANEOUS at 00:00

## 2024-10-03 NOTE — HISTORY OF PRESENT ILLNESS
[de-identified] : Ms. BARNHART is a54 year F with PMH of morbid obesity, PCOS, GERD, Cervical spondylosis, b/l knee OA who comes for follow-up.  No complaints today will like referrals and medication refills as well as for bleeding forms for disability